# Patient Record
Sex: FEMALE | Race: WHITE | NOT HISPANIC OR LATINO | Employment: FULL TIME | URBAN - METROPOLITAN AREA
[De-identification: names, ages, dates, MRNs, and addresses within clinical notes are randomized per-mention and may not be internally consistent; named-entity substitution may affect disease eponyms.]

---

## 2023-10-17 ENCOUNTER — OFFICE VISIT (OUTPATIENT)
Dept: PRIMARY CARE | Facility: CLINIC | Age: 33
End: 2023-10-17
Payer: COMMERCIAL

## 2023-10-17 VITALS
OXYGEN SATURATION: 97 % | DIASTOLIC BLOOD PRESSURE: 76 MMHG | HEIGHT: 65 IN | WEIGHT: 183 LBS | TEMPERATURE: 98.2 F | SYSTOLIC BLOOD PRESSURE: 108 MMHG | BODY MASS INDEX: 30.49 KG/M2 | HEART RATE: 63 BPM

## 2023-10-17 DIAGNOSIS — E55.9 VITAMIN D DEFICIENCY: ICD-10-CM

## 2023-10-17 DIAGNOSIS — Z76.89 ENCOUNTER TO ESTABLISH CARE: Primary | ICD-10-CM

## 2023-10-17 DIAGNOSIS — Z00.00 HEALTHCARE MAINTENANCE: ICD-10-CM

## 2023-10-17 DIAGNOSIS — F41.1 ANXIETY, GENERALIZED: ICD-10-CM

## 2023-10-17 DIAGNOSIS — R53.82 CHRONIC FATIGUE: ICD-10-CM

## 2023-10-17 DIAGNOSIS — F33.0 MILD EPISODE OF RECURRENT MAJOR DEPRESSIVE DISORDER (CMS-HCC): ICD-10-CM

## 2023-10-17 PROBLEM — F33.9 RECURRENT MAJOR DEPRESSIVE DISORDER (CMS-HCC): Status: ACTIVE | Noted: 2023-10-17

## 2023-10-17 PROCEDURE — 1036F TOBACCO NON-USER: CPT | Performed by: STUDENT IN AN ORGANIZED HEALTH CARE EDUCATION/TRAINING PROGRAM

## 2023-10-17 PROCEDURE — 99214 OFFICE O/P EST MOD 30 MIN: CPT | Performed by: STUDENT IN AN ORGANIZED HEALTH CARE EDUCATION/TRAINING PROGRAM

## 2023-10-17 RX ORDER — HYDROXYZINE HYDROCHLORIDE 25 MG/1
25 TABLET, FILM COATED ORAL EVERY 8 HOURS PRN
Qty: 12 TABLET | Refills: 0 | Status: SHIPPED | OUTPATIENT
Start: 2023-10-17 | End: 2024-03-04 | Stop reason: ALTCHOICE

## 2023-10-17 ASSESSMENT — PATIENT HEALTH QUESTIONNAIRE - PHQ9
10. IF YOU CHECKED OFF ANY PROBLEMS, HOW DIFFICULT HAVE THESE PROBLEMS MADE IT FOR YOU TO DO YOUR WORK, TAKE CARE OF THINGS AT HOME, OR GET ALONG WITH OTHER PEOPLE: SOMEWHAT DIFFICULT
SUM OF ALL RESPONSES TO PHQ9 QUESTIONS 1 AND 2: 2
2. FEELING DOWN, DEPRESSED OR HOPELESS: SEVERAL DAYS
1. LITTLE INTEREST OR PLEASURE IN DOING THINGS: SEVERAL DAYS

## 2023-10-17 ASSESSMENT — ENCOUNTER SYMPTOMS
CHILLS: 0
COUGH: 0
DIARRHEA: 0
CONSTIPATION: 0
SHORTNESS OF BREATH: 0
ABDOMINAL PAIN: 0
FATIGUE: 1
RHINORRHEA: 0
FEVER: 0
VOMITING: 0
NAUSEA: 0
PALPITATIONS: 0

## 2023-10-17 NOTE — PATIENT INSTRUCTIONS
I went ahead and sent in the prescription to your pharmacy that you can take as needed for significant breakthrough anxiety.    Lab work orders are in place.  You can get them done downstairs fasting for about 10 hours beforehand if you can.  We will give you call back with those results.    Referral to behavioral health for counseling.  Please let me know if you do not hear back in the next week if they have not reached out to you to get you scheduled for regular follow-up.  Or if there is more that you need, please let me know.    We can talk more once we get the results back of the lab work, however you would like to set up an appointment just to get something on the books, I can see you back within the next couple of weeks.  You can get set up with this before you leave today if you would like.    Thank you

## 2023-10-17 NOTE — PROGRESS NOTES
"Subjective   Patient ID: Pamela Maya is a 32 y.o. female who presents for Anxiety.    Anxiety  Patient reports no chest pain, nausea, palpitations or shortness of breath.            She has a history of both depression and anxiety.  She has been on medication in the past. She has been on different ones but she always felt like a zombie.  She was diagnosed with depression and anxiety about 7 years ago.  She had even been admitted in the past due to its severity.   She has been in counseling in the past which has really helped.  She states that she would like to go to counseling but needs to make sure that they will give her work to do.    She has been really tired with very little energy.  She has been getting enough sleep at night.      Review of Systems   Constitutional:  Positive for fatigue. Negative for chills and fever.   HENT:  Negative for congestion and rhinorrhea.    Respiratory:  Negative for cough and shortness of breath.    Cardiovascular:  Negative for chest pain and palpitations.   Gastrointestinal:  Negative for abdominal pain, constipation, diarrhea, nausea and vomiting.       Objective   /76   Pulse 63   Temp 36.8 °C (98.2 °F)   Ht 1.638 m (5' 4.5\")   Wt 83 kg (183 lb)   SpO2 97%   BMI 30.93 kg/m²     Physical Exam  Vitals and nursing note reviewed.   Constitutional:       General: She is not in acute distress.     Appearance: Normal appearance. She is normal weight. She is not ill-appearing or toxic-appearing.   HENT:      Head: Normocephalic and atraumatic.   Cardiovascular:      Rate and Rhythm: Normal rate and regular rhythm.      Heart sounds: Normal heart sounds.   Pulmonary:      Effort: Pulmonary effort is normal.      Breath sounds: Normal breath sounds.   Abdominal:      General: Bowel sounds are normal.      Palpations: Abdomen is soft.   Neurological:      Mental Status: She is alert.         Assessment/Plan   Problem List Items Addressed This Visit             ICD-10-CM "    Anxiety, generalized F41.1    Relevant Medications    hydrOXYzine HCL (Atarax) 25 mg tablet    Other Relevant Orders    Vitamin D 25-Hydroxy,Total (for eval of Vitamin D levels)    Thyroid Stimulating Hormone    Thyroxine, Free    Vitamin B12    Follow Up In [facility]    Recurrent major depressive disorder (CMS/HCC) F33.9    Relevant Orders    Vitamin D 25-Hydroxy,Total (for eval of Vitamin D levels)    Thyroid Stimulating Hormone    Thyroxine, Free    Vitamin B12    Follow Up In [facility]    Vitamin D deficiency E55.9    Relevant Orders    Vitamin D 25-Hydroxy,Total (for eval of Vitamin D levels)    Chronic fatigue R53.82    Relevant Orders    CBC and Auto Differential    Comprehensive Metabolic Panel    Lipid Panel    Vitamin D 25-Hydroxy,Total (for eval of Vitamin D levels)    Thyroid Stimulating Hormone    Thyroxine, Free    Vitamin B12    Follow Up In [facility]     Other Visit Diagnoses         Codes    Encounter to establish care    -  Primary Z76.89    Healthcare maintenance     Z00.00    Relevant Orders    CBC and Auto Differential    Comprehensive Metabolic Panel    Lipid Panel    Vitamin D 25-Hydroxy,Total (for eval of Vitamin D levels)    Urinalysis with Reflex Microscopic    Thyroid Stimulating Hormone    Thyroxine, Free    Vitamin B12          History and physical examination as above.  Patient presenting to follow-up for increasing recurrent depression and anxiety.  Patient feels that she only needs medication for breakthrough.  Sent in refill for hydroxyzine.  Patient is agreeable to getting a referral for counseling.  She will call back if she does not hear back from them.  Patient will plan on follow-up in the next couple of weeks for a wellness exam as well as to follow-up on anxiety and depression.  Lab work orders to be done  prior to her next appointment.  Patient will come in sooner for other acute concerns or complaints.

## 2023-10-23 ENCOUNTER — LAB (OUTPATIENT)
Dept: LAB | Facility: LAB | Age: 33
End: 2023-10-23
Payer: COMMERCIAL

## 2023-10-23 DIAGNOSIS — Z00.00 HEALTHCARE MAINTENANCE: ICD-10-CM

## 2023-10-23 DIAGNOSIS — E55.9 VITAMIN D DEFICIENCY: ICD-10-CM

## 2023-10-23 DIAGNOSIS — F41.1 ANXIETY, GENERALIZED: ICD-10-CM

## 2023-10-23 DIAGNOSIS — F33.0 MILD EPISODE OF RECURRENT MAJOR DEPRESSIVE DISORDER (CMS-HCC): ICD-10-CM

## 2023-10-23 DIAGNOSIS — R53.82 CHRONIC FATIGUE: ICD-10-CM

## 2023-10-23 LAB
25(OH)D3 SERPL-MCNC: 33 NG/ML (ref 30–100)
ALBUMIN SERPL BCP-MCNC: 4.4 G/DL (ref 3.4–5)
ALP SERPL-CCNC: 66 U/L (ref 33–110)
ALT SERPL W P-5'-P-CCNC: 55 U/L (ref 7–45)
ANION GAP SERPL CALC-SCNC: 13 MMOL/L (ref 10–20)
APPEARANCE UR: ABNORMAL
AST SERPL W P-5'-P-CCNC: 41 U/L (ref 9–39)
BASOPHILS # BLD AUTO: 0.05 X10*3/UL (ref 0–0.1)
BASOPHILS NFR BLD AUTO: 0.7 %
BILIRUB SERPL-MCNC: 0.2 MG/DL (ref 0–1.2)
BILIRUB UR STRIP.AUTO-MCNC: NEGATIVE MG/DL
BUN SERPL-MCNC: 17 MG/DL (ref 6–23)
CALCIUM SERPL-MCNC: 9.3 MG/DL (ref 8.6–10.6)
CHLORIDE SERPL-SCNC: 106 MMOL/L (ref 98–107)
CHOLEST SERPL-MCNC: 132 MG/DL (ref 0–199)
CHOLESTEROL/HDL RATIO: 2.7
CO2 SERPL-SCNC: 29 MMOL/L (ref 21–32)
COLOR UR: YELLOW
CREAT SERPL-MCNC: 0.87 MG/DL (ref 0.5–1.05)
EOSINOPHIL # BLD AUTO: 0.13 X10*3/UL (ref 0–0.7)
EOSINOPHIL NFR BLD AUTO: 1.9 %
ERYTHROCYTE [DISTWIDTH] IN BLOOD BY AUTOMATED COUNT: 11.9 % (ref 11.5–14.5)
GFR SERPL CREATININE-BSD FRML MDRD: >90 ML/MIN/1.73M*2
GLUCOSE SERPL-MCNC: 91 MG/DL (ref 74–99)
GLUCOSE UR STRIP.AUTO-MCNC: NEGATIVE MG/DL
HCT VFR BLD AUTO: 42.9 % (ref 36–46)
HDLC SERPL-MCNC: 48.9 MG/DL
HGB BLD-MCNC: 13.9 G/DL (ref 12–16)
IMM GRANULOCYTES # BLD AUTO: 0.01 X10*3/UL (ref 0–0.7)
IMM GRANULOCYTES NFR BLD AUTO: 0.1 % (ref 0–0.9)
KETONES UR STRIP.AUTO-MCNC: NEGATIVE MG/DL
LDLC SERPL CALC-MCNC: 65 MG/DL
LEUKOCYTE ESTERASE UR QL STRIP.AUTO: NEGATIVE
LYMPHOCYTES # BLD AUTO: 2.63 X10*3/UL (ref 1.2–4.8)
LYMPHOCYTES NFR BLD AUTO: 37.7 %
MCH RBC QN AUTO: 30.1 PG (ref 26–34)
MCHC RBC AUTO-ENTMCNC: 32.4 G/DL (ref 32–36)
MCV RBC AUTO: 93 FL (ref 80–100)
MONOCYTES # BLD AUTO: 0.67 X10*3/UL (ref 0.1–1)
MONOCYTES NFR BLD AUTO: 9.6 %
NEUTROPHILS # BLD AUTO: 3.49 X10*3/UL (ref 1.2–7.7)
NEUTROPHILS NFR BLD AUTO: 50 %
NITRITE UR QL STRIP.AUTO: NEGATIVE
NON HDL CHOLESTEROL: 83 MG/DL (ref 0–149)
NRBC BLD-RTO: 0 /100 WBCS (ref 0–0)
PH UR STRIP.AUTO: 5 [PH]
PLATELET # BLD AUTO: 342 X10*3/UL (ref 150–450)
PMV BLD AUTO: 9.4 FL (ref 7.5–11.5)
POTASSIUM SERPL-SCNC: 4 MMOL/L (ref 3.5–5.3)
PROT SERPL-MCNC: 6.7 G/DL (ref 6.4–8.2)
PROT UR STRIP.AUTO-MCNC: NEGATIVE MG/DL
RBC # BLD AUTO: 4.62 X10*6/UL (ref 4–5.2)
RBC # UR STRIP.AUTO: NEGATIVE /UL
SODIUM SERPL-SCNC: 144 MMOL/L (ref 136–145)
SP GR UR STRIP.AUTO: 1.02
T4 FREE SERPL-MCNC: 1.1 NG/DL (ref 0.78–1.48)
TRIGL SERPL-MCNC: 91 MG/DL (ref 0–149)
TSH SERPL-ACNC: 1.97 MIU/L (ref 0.44–3.98)
UROBILINOGEN UR STRIP.AUTO-MCNC: <2 MG/DL
VIT B12 SERPL-MCNC: 628 PG/ML (ref 211–911)
VLDL: 18 MG/DL (ref 0–40)
WBC # BLD AUTO: 7 X10*3/UL (ref 4.4–11.3)

## 2023-10-23 PROCEDURE — 80061 LIPID PANEL: CPT

## 2023-10-23 PROCEDURE — 80053 COMPREHEN METABOLIC PANEL: CPT

## 2023-10-23 PROCEDURE — 81003 URINALYSIS AUTO W/O SCOPE: CPT

## 2023-10-23 PROCEDURE — 84443 ASSAY THYROID STIM HORMONE: CPT

## 2023-10-23 PROCEDURE — 82607 VITAMIN B-12: CPT

## 2023-10-23 PROCEDURE — 36415 COLL VENOUS BLD VENIPUNCTURE: CPT

## 2023-10-23 PROCEDURE — 85025 COMPLETE CBC W/AUTO DIFF WBC: CPT

## 2023-10-23 PROCEDURE — 82306 VITAMIN D 25 HYDROXY: CPT

## 2023-10-23 PROCEDURE — 84439 ASSAY OF FREE THYROXINE: CPT

## 2023-10-31 ENCOUNTER — SOCIAL WORK (OUTPATIENT)
Dept: PRIMARY CARE | Facility: CLINIC | Age: 33
End: 2023-10-31
Payer: COMMERCIAL

## 2023-10-31 DIAGNOSIS — R53.82 CHRONIC FATIGUE: ICD-10-CM

## 2023-10-31 DIAGNOSIS — F41.1 ANXIETY, GENERALIZED: ICD-10-CM

## 2023-10-31 DIAGNOSIS — F33.0 MILD EPISODE OF RECURRENT MAJOR DEPRESSIVE DISORDER (CMS-HCC): ICD-10-CM
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
## 2023-11-01 ASSESSMENT — ANXIETY QUESTIONNAIRES
4. TROUBLE RELAXING: NEARLY EVERY DAY
5. BEING SO RESTLESS THAT IT IS HARD TO SIT STILL: MORE THAN HALF THE DAYS
2. NOT BEING ABLE TO STOP OR CONTROL WORRYING: NEARLY EVERY DAY
7. FEELING AFRAID AS IF SOMETHING AWFUL MIGHT HAPPEN: NEARLY EVERY DAY
6. BECOMING EASILY ANNOYED OR IRRITABLE: NEARLY EVERY DAY
IF YOU CHECKED OFF ANY PROBLEMS ON THIS QUESTIONNAIRE, HOW DIFFICULT HAVE THESE PROBLEMS MADE IT FOR YOU TO DO YOUR WORK, TAKE CARE OF THINGS AT HOME, OR GET ALONG WITH OTHER PEOPLE: VERY DIFFICULT
3. WORRYING TOO MUCH ABOUT DIFFERENT THINGS: NEARLY EVERY DAY
1. FEELING NERVOUS, ANXIOUS, OR ON EDGE: NEARLY EVERY DAY
GAD7 TOTAL SCORE: 20

## 2023-11-01 ASSESSMENT — PATIENT HEALTH QUESTIONNAIRE - PHQ9
6. FEELING BAD ABOUT YOURSELF - OR THAT YOU ARE A FAILURE OR HAVE LET YOURSELF OR YOUR FAMILY DOWN: NEARLY EVERY DAY
8. MOVING OR SPEAKING SO SLOWLY THAT OTHER PEOPLE COULD HAVE NOTICED. OR THE OPPOSITE, BEING SO FIGETY OR RESTLESS THAT YOU HAVE BEEN MOVING AROUND A LOT MORE THAN USUAL: MORE THAN HALF THE DAYS
2. FEELING DOWN, DEPRESSED OR HOPELESS: MORE THAN HALF THE DAYS
5. POOR APPETITE OR OVEREATING: NEARLY EVERY DAY
9. THOUGHTS THAT YOU WOULD BE BETTER OFF DEAD, OR OF HURTING YOURSELF: MORE THAN HALF THE DAYS
SUM OF ALL RESPONSES TO PHQ QUESTIONS 1-9: 23
1. LITTLE INTEREST OR PLEASURE IN DOING THINGS: NEARLY EVERY DAY
SUM OF ALL RESPONSES TO PHQ9 QUESTIONS 1 & 2: 5
10. IF YOU CHECKED OFF ANY PROBLEMS, HOW DIFFICULT HAVE THESE PROBLEMS MADE IT FOR YOU TO DO YOUR WORK, TAKE CARE OF THINGS AT HOME, OR GET ALONG WITH OTHER PEOPLE: VERY DIFFICULT
7. TROUBLE CONCENTRATING ON THINGS, SUCH AS READING THE NEWSPAPER OR WATCHING TELEVISION: MORE THAN HALF THE DAYS
4. FEELING TIRED OR HAVING LITTLE ENERGY: NEARLY EVERY DAY
3. TROUBLE FALLING OR STAYING ASLEEP: NEARLY EVERY DAY

## 2023-11-03 ENCOUNTER — DOCUMENTATION (OUTPATIENT)
Dept: BEHAVIORAL HEALTH | Facility: CLINIC | Age: 33
End: 2023-11-03
Payer: COMMERCIAL

## 2023-11-03 ENCOUNTER — DOCUMENTATION (OUTPATIENT)
Dept: PRIMARY CARE | Facility: CLINIC | Age: 33
End: 2023-11-03
Payer: COMMERCIAL

## 2023-11-03 DIAGNOSIS — F41.1 ANXIETY, GENERALIZED: ICD-10-CM

## 2023-11-03 DIAGNOSIS — F33.0 MILD EPISODE OF RECURRENT MAJOR DEPRESSIVE DISORDER (CMS-HCC): Primary | ICD-10-CM

## 2023-11-03 PROCEDURE — 99492 1ST PSYC COLLAB CARE MGMT: CPT | Performed by: STUDENT IN AN ORGANIZED HEALTH CARE EDUCATION/TRAINING PROGRAM
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
## 2023-11-13 ENCOUNTER — OFFICE VISIT (OUTPATIENT)
Dept: PRIMARY CARE | Facility: CLINIC | Age: 33
End: 2023-11-13
Payer: COMMERCIAL

## 2023-11-13 VITALS
HEART RATE: 68 BPM | OXYGEN SATURATION: 95 % | WEIGHT: 180 LBS | TEMPERATURE: 96 F | HEIGHT: 65 IN | BODY MASS INDEX: 29.99 KG/M2 | DIASTOLIC BLOOD PRESSURE: 71 MMHG | SYSTOLIC BLOOD PRESSURE: 104 MMHG

## 2023-11-13 DIAGNOSIS — F33.1 MODERATE EPISODE OF RECURRENT MAJOR DEPRESSIVE DISORDER (MULTI): ICD-10-CM

## 2023-11-13 DIAGNOSIS — F41.1 ANXIETY, GENERALIZED: ICD-10-CM

## 2023-11-13 DIAGNOSIS — R10.13 EPIGASTRIC PAIN: ICD-10-CM

## 2023-11-13 DIAGNOSIS — R10.12 LEFT UPPER QUADRANT PAIN: ICD-10-CM

## 2023-11-13 DIAGNOSIS — E55.9 VITAMIN D INSUFFICIENCY: Primary | ICD-10-CM

## 2023-11-13 DIAGNOSIS — R10.11 RIGHT UPPER QUADRANT PAIN: ICD-10-CM

## 2023-11-13 PROCEDURE — 1036F TOBACCO NON-USER: CPT | Performed by: STUDENT IN AN ORGANIZED HEALTH CARE EDUCATION/TRAINING PROGRAM

## 2023-11-13 PROCEDURE — 99214 OFFICE O/P EST MOD 30 MIN: CPT | Performed by: STUDENT IN AN ORGANIZED HEALTH CARE EDUCATION/TRAINING PROGRAM

## 2023-11-13 RX ORDER — OMEPRAZOLE 40 MG/1
40 CAPSULE, DELAYED RELEASE ORAL
Qty: 30 CAPSULE | Refills: 0 | Status: SHIPPED | OUTPATIENT
Start: 2023-11-13 | End: 2023-12-11 | Stop reason: ALTCHOICE

## 2023-11-13 RX ORDER — VENLAFAXINE HYDROCHLORIDE 75 MG/1
75 CAPSULE, EXTENDED RELEASE ORAL DAILY
Qty: 30 CAPSULE | Refills: 1 | Status: SHIPPED | OUTPATIENT
Start: 2023-11-13 | End: 2023-12-11 | Stop reason: SDUPTHER

## 2023-11-13 RX ORDER — ERGOCALCIFEROL 1.25 MG/1
50000 CAPSULE ORAL
Qty: 8 CAPSULE | Refills: 0 | Status: SHIPPED | OUTPATIENT
Start: 2023-11-13 | End: 2024-01-08

## 2023-11-13 RX ORDER — SUCRALFATE 1 G/1
1 TABLET ORAL
Qty: 32 TABLET | Refills: 0 | Status: SHIPPED | OUTPATIENT
Start: 2023-11-13 | End: 2023-12-11 | Stop reason: ALTCHOICE

## 2023-11-13 ASSESSMENT — PATIENT HEALTH QUESTIONNAIRE - PHQ9
10. IF YOU CHECKED OFF ANY PROBLEMS, HOW DIFFICULT HAVE THESE PROBLEMS MADE IT FOR YOU TO DO YOUR WORK, TAKE CARE OF THINGS AT HOME, OR GET ALONG WITH OTHER PEOPLE: VERY DIFFICULT
2. FEELING DOWN, DEPRESSED OR HOPELESS: MORE THAN HALF THE DAYS
6. FEELING BAD ABOUT YOURSELF - OR THAT YOU ARE A FAILURE OR HAVE LET YOURSELF OR YOUR FAMILY DOWN: MORE THAN HALF THE DAYS
4. FEELING TIRED OR HAVING LITTLE ENERGY: NEARLY EVERY DAY
SUM OF ALL RESPONSES TO PHQ9 QUESTIONS 1 AND 2: 4
5. POOR APPETITE OR OVEREATING: SEVERAL DAYS
3. TROUBLE FALLING OR STAYING ASLEEP OR SLEEPING TOO MUCH: MORE THAN HALF THE DAYS
SUM OF ALL RESPONSES TO PHQ QUESTIONS 1-9: 15
9. THOUGHTS THAT YOU WOULD BE BETTER OFF DEAD, OR OF HURTING YOURSELF: MORE THAN HALF THE DAYS
8. MOVING OR SPEAKING SO SLOWLY THAT OTHER PEOPLE COULD HAVE NOTICED. OR THE OPPOSITE, BEING SO FIGETY OR RESTLESS THAT YOU HAVE BEEN MOVING AROUND A LOT MORE THAN USUAL: SEVERAL DAYS
7. TROUBLE CONCENTRATING ON THINGS, SUCH AS READING THE NEWSPAPER OR WATCHING TELEVISION: NOT AT ALL
1. LITTLE INTEREST OR PLEASURE IN DOING THINGS: MORE THAN HALF THE DAYS

## 2023-11-13 ASSESSMENT — COLUMBIA-SUICIDE SEVERITY RATING SCALE - C-SSRS
1. IN THE PAST MONTH, HAVE YOU WISHED YOU WERE DEAD OR WISHED YOU COULD GO TO SLEEP AND NOT WAKE UP?: YES
6. HAVE YOU EVER DONE ANYTHING, STARTED TO DO ANYTHING, OR PREPARED TO DO ANYTHING TO END YOUR LIFE?: NO
2. HAVE YOU ACTUALLY HAD ANY THOUGHTS OF KILLING YOURSELF?: NO

## 2023-11-13 NOTE — PATIENT INSTRUCTIONS
Thank you for coming in for your appointment today.    I went ahead and started the medication venlafaxine/Effexor for you.  We are starting at 75 mg.  It is a capsule so you will not be able to take a reduced dose or cut the capsule in half.  This is a daily medication.  He can be taken either in the morning or the evening but I do recommend starting off taking first off.  If you do have some minor side effects and they are fairly bearable, most of them will go away within the first 1 to 2 weeks.  If there is anything more serious especially if you are having any thoughts about hurting yourself or anything becoming more serious or changing, you need to stop the medication immediately and at least let the office know or if it is something more concerning you need to go to the emergency department.    We are also going to try to work-up some of this increased abdominal pain.  I put in for an abdominal ultrasound as it does not look like there is ever been 1 done in the past before.  I am also going to start 2 different medications.  One of them is a trial of omeprazole to see if there is a reflux component in relation to your symptoms.  Please start this medication first.  If it does not help, then go ahead and add some of the Carafate to this as well to see if this could be helpful.  It sounds like in the last upper scope that they did they did see some gastritis which would be inflammation of the stomach.  We can see if this is a component of your symptoms especially given the location.    Lets make sure that we follow-up within the next month.  You can always call sooner if anything changes we can try to get you in before that time.    If you are not able to arrive at a good place with either condition, we can always refer either to gastroenterology or to a separate psychiatrist if needed.    Please call with any other questions or concerns.    Thank you

## 2023-11-13 NOTE — PROGRESS NOTES
"Subjective   Patient ID: Pamela Maya is a 32 y.o. female who presents for Follow-up.    HPI     She is coming in for follow up.  Patient reports that she has been having increased symptoms of her combination anxiety and depression.  She has been going to counseling which has been significantly helping however she thinks that she needs additional treatment.  She thinks that she agreeable to starting more of a controller medication.  The medication that she had been on taking it as needed has not been helping.  She denies any thoughts of harming herself or anyone else.  She does have another counseling session coming up at the end of the week.    She is also been having intermittent abdominal pain which she has not really gotten a good answer for what she is experiencing.  She reports pain primarily in the upper quadrants both on the right and left side as well as the gastric region.  She has never been really tried on different treatments before in the past or had any sort of imaging done either.  She states that the symptoms are mostly always there but definitely flare up occasionally at times.      Review of Systems   Constitutional:  Positive for fatigue. Negative for chills and fever.   HENT:  Negative for congestion and rhinorrhea.    Respiratory:  Negative for cough and shortness of breath.    Cardiovascular:  Negative for chest pain and palpitations.   Gastrointestinal:  Positive for abdominal pain. Negative for constipation, diarrhea, nausea and vomiting.   Psychiatric/Behavioral:  Positive for dysphoric mood. The patient is nervous/anxious.        Objective   /71   Pulse 68   Temp 35.6 °C (96 °F)   Ht 1.638 m (5' 4.5\")   Wt 81.6 kg (180 lb)   SpO2 95%   BMI 30.42 kg/m²     Physical Exam  Vitals and nursing note reviewed.   Constitutional:       General: She is not in acute distress.     Appearance: Normal appearance. She is normal weight. She is not ill-appearing or toxic-appearing.   HENT: "      Head: Normocephalic and atraumatic.   Cardiovascular:      Rate and Rhythm: Normal rate and regular rhythm.      Heart sounds: Normal heart sounds.   Pulmonary:      Effort: Pulmonary effort is normal.      Breath sounds: Normal breath sounds.   Abdominal:      General: Bowel sounds are normal.      Palpations: Abdomen is soft.      Tenderness: There is abdominal tenderness (Mild in the epigastric, right upper quadrant and left upper quadrant).   Neurological:      Mental Status: She is alert.         Assessment/Plan   Problem List Items Addressed This Visit             ICD-10-CM    Anxiety, generalized F41.1    Relevant Medications    venlafaxine XR (Effexor-XR) 75 mg 24 hr capsule    Recurrent major depressive disorder (CMS/HCC) F33.9    Relevant Medications    venlafaxine XR (Effexor-XR) 75 mg 24 hr capsule     Other Visit Diagnoses         Codes    Vitamin D insufficiency    -  Primary E55.9    Relevant Medications    ergocalciferol (Vitamin D-2) 1.25 MG (89124 UT) capsule    Other Relevant Orders    Vitamin D 25-Hydroxy,Total (for eval of Vitamin D levels)    Epigastric pain     R10.13    Relevant Medications    omeprazole (PriLOSEC) 40 mg DR capsule    sucralfate (Carafate) 1 gram tablet    Other Relevant Orders    US abdomen    Right upper quadrant pain     R10.11    Relevant Orders    US abdomen    Left upper quadrant pain     R10.12    Relevant Medications    omeprazole (PriLOSEC) 40 mg DR capsule    sucralfate (Carafate) 1 gram tablet    Other Relevant Orders    US abdomen          History and physical examination as above.  We will plan on starting on medication with venlafaxine.  This medication looks like it was recommended by psychiatric consult with her collaborative behavioral health as well.  Discussed what to expect with the patient and instructed to call immediately if she starts having any other side effects or symptoms.  She will continue with regular counseling sessions with her next one  scheduled at the end of the week.  Will start workup for some of the abdominal pain with an abdominal ultrasound since she has never had any imaging in the past before.  We will plan on starting on omeprazole to see if there is a reflux component to her symptoms as well as sucralfate to see if this can also calm down symptoms in case this is a possible ulcer.  If this does take care of her symptoms, depending on longevity, may need additional follow-up with GI.  Plan on following up in 1 month.  Discussed signs and symptoms that would require reevaluation in the office versus immediate treatment in the emergency department at a sooner time.  She is understanding and in agreement with this plan.      Go for blood tests as directed. Your doctor will do lab tests at regular visits to monitor the effects of this medicine. Please follow up with your doctor and keep your health care provider appointments.

## 2023-11-15 ENCOUNTER — TELEMEDICINE (OUTPATIENT)
Dept: PRIMARY CARE | Facility: CLINIC | Age: 33
End: 2023-11-15
Payer: COMMERCIAL

## 2023-11-15 DIAGNOSIS — F41.1 ANXIETY, GENERALIZED: Primary | ICD-10-CM

## 2023-11-15 NOTE — PROGRESS NOTES
[facility] ([facility])  Progress Note    Type of Interaction: Virtual    Start Time: 3:30 pm    End Time: 4:28 pm        Appointment: Scheduled    Reason for Visit:   Chief Complaint   Patient presents with    Anxiety    Depression    Met with patient for a follow up virtual appointment. Patient was put on effexor by Dr. Castro and just started yesterday. Patient has been feeling pretty exhausted, overall mood is still low. Patient is going to be using opposite action and going out with coworkers, her  encouraged her to stick to her commitment.  Patient wants to work on how to get out of downward spiral thinking- beat self up for feeling how she is feeling and feels like she should be feeling better.    Patient puts on a front with her mood and then is exhausted after the fact  Feels like it goes from one extreme to another with her emotions; wants to find a middle ground. Patient would like to be able to be her authentic self and understand what that means and who she is and no letting her mental health label/define her.   Will discuss healthy ways to explore self as an adult and encouraged patient to speak with  about her having down time to have self care bc it is very limited or really non existent as of currently.           Interval History / Patient Symptoms:     Patient Health Questionnaire-9 Score: 15 (11/13/2023  4:15 PM)        Interventions Provided: Psychoeducation and Treatment Planning      Progress Made: Mixed    Response to Intervention: patient open and engaging and able to identify what she struggles with and what she would like to work on moving forward.         Plan:   Follow up in 2 weeks  Will refer to CBT trauma therapist.        Follow Up / Next Appointment: Next appointment: 11/29/23

## 2023-11-20 ENCOUNTER — ANCILLARY PROCEDURE (OUTPATIENT)
Dept: RADIOLOGY | Facility: CLINIC | Age: 33
End: 2023-11-20
Payer: COMMERCIAL

## 2023-11-20 DIAGNOSIS — R10.13 EPIGASTRIC PAIN: ICD-10-CM

## 2023-11-20 DIAGNOSIS — R10.11 RIGHT UPPER QUADRANT PAIN: ICD-10-CM

## 2023-11-20 DIAGNOSIS — R10.12 LEFT UPPER QUADRANT PAIN: ICD-10-CM

## 2023-11-20 PROCEDURE — 76700 US EXAM ABDOM COMPLETE: CPT

## 2023-11-20 PROCEDURE — 76700 US EXAM ABDOM COMPLETE: CPT | Performed by: RADIOLOGY

## 2023-11-26 ASSESSMENT — ENCOUNTER SYMPTOMS
RHINORRHEA: 0
DYSPHORIC MOOD: 1
DIARRHEA: 0
NAUSEA: 0
FATIGUE: 1
PALPITATIONS: 0
CONSTIPATION: 0
NERVOUS/ANXIOUS: 1
SHORTNESS OF BREATH: 0
ABDOMINAL PAIN: 1
FEVER: 0
COUGH: 0
VOMITING: 0
CHILLS: 0

## 2023-11-28 ENCOUNTER — APPOINTMENT (OUTPATIENT)
Dept: PRIMARY CARE | Facility: CLINIC | Age: 33
End: 2023-11-28
Payer: COMMERCIAL

## 2023-11-29 ENCOUNTER — TELEMEDICINE (OUTPATIENT)
Dept: PRIMARY CARE | Facility: CLINIC | Age: 33
End: 2023-11-29
Payer: COMMERCIAL

## 2023-11-29 DIAGNOSIS — F33.0 MILD EPISODE OF RECURRENT MAJOR DEPRESSIVE DISORDER (CMS-HCC): Primary | ICD-10-CM

## 2023-11-29 NOTE — PROGRESS NOTES
[facility] ([facility])  Progress Note    Type of Interaction: [employer]    Start Time: 3:30 pm    End Time: 4:30 pm        Appointment: Scheduled    Reason for Visit:   Chief Complaint   Patient presents with    Depression    Met with patient for a follow up appointment. Patient feels like medications are kicking in and she feels her depression and anxiety are better. Still lacking some motivation though. Discussed the co worker get together and how it was not her cup of tea, does not feel like she would do it again but at least used opposite action and attended.  Found 3 women at work, who are supportive of one another. Patient has never had friendships where she was the one who is being given help and support or small tokens of appreciation. Typically patient is the one bending over backwards and this is a new feeling for her, but she feels appreciated and cared about and it is no pressure to hang out outside of work due to schedules.   Working with boundaries with family during the holidays. Patient's  is working over time and patient cannot always accommodate when others in her family want to get together and is assertive and has told these individuals her reasoning. She is at the point where she has explained and that it is, if they do not like it, it is not her problem. Patient also processed some of her relationship with her mom and how it is complicated and patient just wants love and acceptance from her mom and she has not had that in many years.   Patient also wants to gain more coping skills to regulate her emotions and help improve her motivation, so down the road patient is not always relying on medication for help.       Interval History / Patient Symptoms:     No data recorded      Interventions Provided: [place] Setting, Communication Training, and support      Progress Made: Moderate    Response to Intervention: Patient very open and engaging.        Plan: follow up in 2 weeks    Follow Up  / Next Appointment: Next appointment: 12/13/23

## 2023-12-04 ENCOUNTER — DOCUMENTATION (OUTPATIENT)
Dept: PRIMARY CARE | Facility: CLINIC | Age: 33
End: 2023-12-04
Payer: COMMERCIAL

## 2023-12-04 DIAGNOSIS — F41.1 ANXIETY, GENERALIZED: Primary | ICD-10-CM

## 2023-12-04 DIAGNOSIS — F33.9 EPISODE OF RECURRENT MAJOR DEPRESSIVE DISORDER, UNSPECIFIED DEPRESSION EPISODE SEVERITY (CMS-HCC): ICD-10-CM

## 2023-12-04 PROCEDURE — 99494 1ST/SBSQ PSYC COLLAB CARE: CPT | Performed by: STUDENT IN AN ORGANIZED HEALTH CARE EDUCATION/TRAINING PROGRAM

## 2023-12-04 PROCEDURE — 99493 SBSQ PSYC COLLAB CARE MGMT: CPT | Performed by: STUDENT IN AN ORGANIZED HEALTH CARE EDUCATION/TRAINING PROGRAM

## 2023-12-11 ENCOUNTER — OFFICE VISIT (OUTPATIENT)
Dept: PRIMARY CARE | Facility: CLINIC | Age: 33
End: 2023-12-11
Payer: COMMERCIAL

## 2023-12-11 VITALS
DIASTOLIC BLOOD PRESSURE: 70 MMHG | WEIGHT: 180 LBS | BODY MASS INDEX: 29.99 KG/M2 | TEMPERATURE: 96.3 F | HEART RATE: 62 BPM | HEIGHT: 65 IN | OXYGEN SATURATION: 97 % | SYSTOLIC BLOOD PRESSURE: 113 MMHG

## 2023-12-11 DIAGNOSIS — R10.12 LEFT UPPER QUADRANT PAIN: ICD-10-CM

## 2023-12-11 DIAGNOSIS — K82.4 GALLBLADDER POLYP: ICD-10-CM

## 2023-12-11 DIAGNOSIS — F33.1 MODERATE EPISODE OF RECURRENT MAJOR DEPRESSIVE DISORDER (MULTI): Primary | ICD-10-CM

## 2023-12-11 DIAGNOSIS — F41.1 ANXIETY, GENERALIZED: ICD-10-CM

## 2023-12-11 PROCEDURE — 1036F TOBACCO NON-USER: CPT | Performed by: STUDENT IN AN ORGANIZED HEALTH CARE EDUCATION/TRAINING PROGRAM

## 2023-12-11 PROCEDURE — 99214 OFFICE O/P EST MOD 30 MIN: CPT | Performed by: STUDENT IN AN ORGANIZED HEALTH CARE EDUCATION/TRAINING PROGRAM

## 2023-12-11 RX ORDER — VENLAFAXINE HYDROCHLORIDE 75 MG/1
75 CAPSULE, EXTENDED RELEASE ORAL DAILY
Qty: 90 CAPSULE | Refills: 0 | Status: SHIPPED | OUTPATIENT
Start: 2023-12-11 | End: 2024-03-04 | Stop reason: SDUPTHER

## 2023-12-11 ASSESSMENT — PATIENT HEALTH QUESTIONNAIRE - PHQ9
SUM OF ALL RESPONSES TO PHQ9 QUESTIONS 1 AND 2: 2
1. LITTLE INTEREST OR PLEASURE IN DOING THINGS: SEVERAL DAYS
10. IF YOU CHECKED OFF ANY PROBLEMS, HOW DIFFICULT HAVE THESE PROBLEMS MADE IT FOR YOU TO DO YOUR WORK, TAKE CARE OF THINGS AT HOME, OR GET ALONG WITH OTHER PEOPLE: NOT DIFFICULT AT ALL
2. FEELING DOWN, DEPRESSED OR HOPELESS: SEVERAL DAYS

## 2023-12-11 ASSESSMENT — ENCOUNTER SYMPTOMS
RHINORRHEA: 0
NERVOUS/ANXIOUS: 1
VOMITING: 0
CONSTIPATION: 0
PALPITATIONS: 0
ABDOMINAL PAIN: 1
FEVER: 0
SHORTNESS OF BREATH: 0
DYSPHORIC MOOD: 1
NAUSEA: 0
DIARRHEA: 0
COUGH: 0
FATIGUE: 0
CHILLS: 0

## 2023-12-11 NOTE — PROGRESS NOTES
"Subjective   Patient ID: Pamela Maya is a 32 y.o. female who presents for Follow-up.    HPI     She has been doing well with the venlafaxine.  She has been taking it at night. She had started getting a headache with the medication a few hours after. These have overall improved and she has not noticed headaches even at night.  She noticed that even when she first started taking it she felt more relaxed.  She has been doing more of the things that she normally enjoys.  She definitely feels that this has been helping both her depression and her increased anxiety.  She has noticed a significant difference especially coming up on the holidays.  She would like to continue the medication at the same dose for now.   She is continuing to follow with counseling and definitely feels like this is also helping.    Patient is still having abdominal pain primarily in the left upper quadrant.  She admits to an occasional right upper quadrant pain but is primarily in the left.  This has been largely unchanged despite her previous treatment with omeprazole and the Carafate.    Review of Systems   Constitutional:  Negative for chills, fatigue and fever.   HENT:  Negative for congestion and rhinorrhea.    Respiratory:  Negative for cough and shortness of breath.    Cardiovascular:  Negative for chest pain and palpitations.   Gastrointestinal:  Positive for abdominal pain. Negative for constipation, diarrhea, nausea and vomiting.   Psychiatric/Behavioral:  Positive for dysphoric mood. The patient is nervous/anxious.        Objective   /70   Pulse 62   Temp 35.7 °C (96.3 °F)   Ht 1.638 m (5' 4.5\")   Wt 81.6 kg (180 lb)   SpO2 97%   BMI 30.42 kg/m²     Physical Exam  Vitals and nursing note reviewed.   Constitutional:       General: She is not in acute distress.     Appearance: Normal appearance. She is normal weight. She is not ill-appearing or toxic-appearing.   HENT:      Head: Normocephalic and atraumatic. "   Cardiovascular:      Rate and Rhythm: Normal rate and regular rhythm.      Heart sounds: Normal heart sounds.   Pulmonary:      Effort: Pulmonary effort is normal.      Breath sounds: Normal breath sounds.   Abdominal:      General: Bowel sounds are normal.      Palpations: Abdomen is soft.      Tenderness: There is abdominal tenderness (Mild in the epigastric, right upper quadrant and left upper quadrant).   Neurological:      Mental Status: She is alert.         Assessment/Plan   Problem List Items Addressed This Visit             ICD-10-CM    Anxiety, generalized F41.1    Relevant Medications    venlafaxine XR (Effexor-XR) 75 mg 24 hr capsule    Recurrent major depressive disorder (CMS/HCC) - Primary F33.9    Relevant Medications    venlafaxine XR (Effexor-XR) 75 mg 24 hr capsule     Other Visit Diagnoses         Codes    Gallbladder polyp     K82.4    Relevant Orders    Referral to General Surgery    Left upper quadrant pain     R10.12    Relevant Orders    Referral to General Surgery          History and physical examination as above.  Patient presenting for follow-up.  Patient overall doing better on the venlafaxine medication for depression anxiety.  Will continue at current dosing for now.  Patient with continued abdominal pain primarily now in the right upper quadrant.  It has been in the epigastric area and in the right upper quadrant in the past.  No change with the omeprazole or the Carafate.  Ultrasound not showing any abnormal findings except for gallbladder polyp.  Still uncertain etiology of patient's symptoms as she states this has been going on for some time.  We will try referring to general surgery to see if there is any additional testing they could do to help to isolate the area.  May also try gastroenterology as well.  Plan for follow-up in the next 3 months.  Patient will come in sooner for any other acute concerns or complaints.

## 2023-12-13 ENCOUNTER — TELEMEDICINE (OUTPATIENT)
Dept: PRIMARY CARE | Facility: CLINIC | Age: 33
End: 2023-12-13
Payer: COMMERCIAL

## 2023-12-13 DIAGNOSIS — F33.9 EPISODE OF RECURRENT MAJOR DEPRESSIVE DISORDER, UNSPECIFIED DEPRESSION EPISODE SEVERITY (CMS-HCC): Primary | ICD-10-CM

## 2023-12-13 NOTE — PROGRESS NOTES
[facility] ([facility])  Progress Note    Type of Interaction: Virtual    Start Time: 3:30 pm    End Time: 4:30 pm        Appointment: Scheduled    Reason for Visit:   Chief Complaint   Patient presents with    Depression    Anxiety    Meeting with patient virtually for a follow up visit.   Patient has gotten back into working out and reading and routines, feeling good about this and motivation has increased. Patient is feeling better but still not really feel con yet, feels more mellowed out. Discussed that some things going on at home and how much her  has been working may be part of why patient is not feeling more of her positive emotions due to stress.   Patient's  is leaving for 3 weeks for work at the end of January. Patient is feeling a little detached from her  just because of his work schedule, and feels like she is a single parent. She is trying to be supportive of his work and his job though. Patient does a good job in communication what she needs and is looking for when it comes to her  but wants to talk with him after he returns from the work trip about his schedule and some changes that can be made. He is up for a promotion in February and hopefully will be able to get off the shift he is currently on, which they were supposed to re evaluate 6 months into it and now it has been 2 years. Processed more of the relationship with her  and what patient feels is lacking and what she is looking for or feels is missing.   .  Interval History / Patient Symptoms:     No data recorded      Interventions Provided:  review progress, support, communication.      Progress Made: Moderate    Response to Intervention: Patient open and engaging.        Plan:   Follow up in 3 weeks    Follow Up / Next Appointment: Next appointment: 01/02/24

## 2023-12-27 ENCOUNTER — APPOINTMENT (OUTPATIENT)
Dept: PRIMARY CARE | Facility: CLINIC | Age: 33
End: 2023-12-27
Payer: COMMERCIAL

## 2023-12-27 ENCOUNTER — OFFICE VISIT (OUTPATIENT)
Dept: PRIMARY CARE | Facility: CLINIC | Age: 33
End: 2023-12-27
Payer: COMMERCIAL

## 2023-12-27 VITALS
TEMPERATURE: 97.8 F | HEART RATE: 85 BPM | BODY MASS INDEX: 29.32 KG/M2 | SYSTOLIC BLOOD PRESSURE: 97 MMHG | WEIGHT: 176 LBS | HEIGHT: 65 IN | DIASTOLIC BLOOD PRESSURE: 75 MMHG | OXYGEN SATURATION: 96 %

## 2023-12-27 DIAGNOSIS — R09.81 NASAL CONGESTION: ICD-10-CM

## 2023-12-27 DIAGNOSIS — J06.9 VIRAL URI WITH COUGH: Primary | ICD-10-CM

## 2023-12-27 PROCEDURE — 1036F TOBACCO NON-USER: CPT | Performed by: STUDENT IN AN ORGANIZED HEALTH CARE EDUCATION/TRAINING PROGRAM

## 2023-12-27 PROCEDURE — 99213 OFFICE O/P EST LOW 20 MIN: CPT | Performed by: STUDENT IN AN ORGANIZED HEALTH CARE EDUCATION/TRAINING PROGRAM

## 2023-12-27 PROCEDURE — 87637 SARSCOV2&INF A&B&RSV AMP PRB: CPT

## 2023-12-27 ASSESSMENT — ENCOUNTER SYMPTOMS
RHINORRHEA: 1
CONSTIPATION: 0
VOMITING: 0
ABDOMINAL PAIN: 0
SINUS PAIN: 0
DIARRHEA: 0
COUGH: 1
DIZZINESS: 0
FATIGUE: 1
CHILLS: 1
SHORTNESS OF BREATH: 1
SORE THROAT: 0
HEADACHES: 0
ARTHRALGIAS: 0
LIGHT-HEADEDNESS: 0
SINUS PRESSURE: 0
FEVER: 1
NAUSEA: 0

## 2023-12-27 ASSESSMENT — PATIENT HEALTH QUESTIONNAIRE - PHQ9
1. LITTLE INTEREST OR PLEASURE IN DOING THINGS: NOT AT ALL
2. FEELING DOWN, DEPRESSED OR HOPELESS: NOT AT ALL
SUM OF ALL RESPONSES TO PHQ9 QUESTIONS 1 AND 2: 0

## 2023-12-27 NOTE — PROGRESS NOTES
"Subjective   Patient ID: Pamela Maya is a 33 y.o. female who presents for Fever (103, wheezing).    Fever   Associated symptoms include congestion and coughing. Pertinent negatives include no abdominal pain, chest pain, diarrhea, headaches, nausea, sore throat or vomiting.        Symptoms for the past 6 days ago.  She started with body aches and fever. She had some pain in the right back also in general.  Fever was also as high as 103. This was staying level with Tylneol and Motrin  About 2-3 days ago she started having a lot of sneezing. Now she is having more of a chest cough as well.  She lost her sense of taste that started a few days ago as well.  She has not had any of her COVID immunizations.    Review of Systems   Constitutional:  Positive for chills, fatigue and fever.   HENT:  Positive for congestion, postnasal drip, rhinorrhea and sneezing. Negative for sinus pressure, sinus pain and sore throat.    Respiratory:  Positive for cough and shortness of breath.    Cardiovascular:  Negative for chest pain.   Gastrointestinal:  Negative for abdominal pain, constipation, diarrhea, nausea and vomiting.   Musculoskeletal:  Negative for arthralgias.   Neurological:  Negative for dizziness, light-headedness and headaches.       Objective   BP 97/75   Pulse 85   Temp 36.6 °C (97.8 °F)   Ht 1.638 m (5' 4.5\")   Wt 79.8 kg (176 lb)   SpO2 96%   BMI 29.74 kg/m²     Physical Exam  Vitals and nursing note reviewed.   Constitutional:       General: She is not in acute distress.     Appearance: Normal appearance. She is normal weight. She is ill-appearing. She is not toxic-appearing.   HENT:      Head: Normocephalic and atraumatic.   Cardiovascular:      Rate and Rhythm: Normal rate and regular rhythm.      Heart sounds: Normal heart sounds.   Pulmonary:      Effort: Pulmonary effort is normal.      Breath sounds: Normal breath sounds.   Neurological:      Mental Status: She is alert.         Assessment/Plan "   Problem List Items Addressed This Visit    None  Visit Diagnoses         Codes    Viral URI with cough    -  Primary J06.9    Relevant Orders    Sars-CoV-2 PCR, Symptomatic (Completed)    RSV PCR (Completed)    Influenza A, and B PCR (Completed)    Nasal congestion     R09.81    Relevant Orders    Sars-CoV-2 PCR, Symptomatic (Completed)    RSV PCR (Completed)    Influenza A, and B PCR (Completed)          History and physical examination as above.  Patient with upper respiratory viral illness with current symptoms.  Patient did have known COVID exposure and other sick contacts.  Testing for COVID, RSV and flu.  Discussed over-the-counter medications that the patient can use to help treat her symptoms.  Patient is past the point of being able to get Paxlovid if her COVID testing is positive, or Tamiflu if her flu testing is positive.  Patient thinks that she is starting to improve a little bit with her symptoms.  She has not been immunized against COVID.  We will contact patient with results of testing.  Discussed that if symptoms worsen or change, we would need to see her back in the office, or if they become increasingly concerning, she would need to go to the emergency department for further evaluation.  We can evaluate her with a chest x-ray if symptoms continue.  She is understanding and in agreement with this plan.

## 2023-12-28 LAB
FLUAV RNA RESP QL NAA+PROBE: NOT DETECTED
FLUBV RNA RESP QL NAA+PROBE: DETECTED
RSV RNA RESP QL NAA+PROBE: NOT DETECTED
SARS-COV-2 RNA RESP QL NAA+PROBE: NOT DETECTED

## 2024-01-02 ENCOUNTER — SOCIAL WORK (OUTPATIENT)
Dept: PRIMARY CARE | Facility: CLINIC | Age: 34
End: 2024-01-02
Payer: COMMERCIAL

## 2024-01-02 NOTE — PROGRESS NOTES
[facility] ([facility])  Progress Note    Type of Interaction: In Office    Start Time: 9:30 am    End Time: 10:30        Appointment: Scheduled    Reason for Visit:   Chief Complaint   Patient presents with    Depression    Met with patient in office, patient was sick starting around dena, feels better now. Patient celebrated birthday recently, and for dena germania saw step sisters and then went to husbands family for dena, and  saw mom for dinner one day as well. Mom's dynamic has changed in a positive way and patient feels like she has more awareness. Processed more of relationship with mom and also how she has laid down boundaries with her mom. Patient is able to talk with her children about respecting others but then not tolerating certain behavior and putting in boundaries so they have more of an understanding when certain things happen.   Had nice long talk with ,  about how she has been feeling as of late and what she is needing from him.  has been promoted and now does not have to travel for work and there is potential for his job to be during the day time instead of third shift. Patient believes that this was meant to happen and that a higher power aided in giving them what they needed as a family. Patient is hopeful moving forward for the future.   Next visit will patient resources for trauma therapists/EMDR as she wants to start working on some unresolved issues.       Interval History / Patient Symptoms:     Patient Health Questionnaire-9 Score: 6 (1/4/2024  1:34 PM)  CONY-7 Total Score: 4 (1/4/2024  1:35 PM)        Interventions Provided:  support and reviewing progress      Progress Made: Moderate    Response to Intervention: patient feeling better and more motivated and has a decrease in her depression and anxiety scales.         Plan:   Follow up in 2 weeks.    Follow Up / Next Appointment: Next appointment: 01/17/24

## 2024-01-03 ENCOUNTER — DOCUMENTATION (OUTPATIENT)
Dept: BEHAVIORAL HEALTH | Facility: CLINIC | Age: 34
End: 2024-01-03
Payer: COMMERCIAL

## 2024-01-04 ENCOUNTER — DOCUMENTATION (OUTPATIENT)
Dept: PRIMARY CARE | Facility: CLINIC | Age: 34
End: 2024-01-04
Payer: COMMERCIAL

## 2024-01-04 DIAGNOSIS — F33.9 EPISODE OF RECURRENT MAJOR DEPRESSIVE DISORDER, UNSPECIFIED DEPRESSION EPISODE SEVERITY (CMS-HCC): ICD-10-CM

## 2024-01-04 DIAGNOSIS — F41.1 ANXIETY, GENERALIZED: Primary | ICD-10-CM

## 2024-01-04 PROCEDURE — 99493 SBSQ PSYC COLLAB CARE MGMT: CPT | Performed by: STUDENT IN AN ORGANIZED HEALTH CARE EDUCATION/TRAINING PROGRAM

## 2024-01-04 ASSESSMENT — ANXIETY QUESTIONNAIRES
7. FEELING AFRAID AS IF SOMETHING AWFUL MIGHT HAPPEN: NOT AT ALL
2. NOT BEING ABLE TO STOP OR CONTROL WORRYING: SEVERAL DAYS
6. BECOMING EASILY ANNOYED OR IRRITABLE: NOT AT ALL
1. FEELING NERVOUS, ANXIOUS, OR ON EDGE: SEVERAL DAYS
4. TROUBLE RELAXING: NOT AT ALL
3. WORRYING TOO MUCH ABOUT DIFFERENT THINGS: SEVERAL DAYS
5. BEING SO RESTLESS THAT IT IS HARD TO SIT STILL: SEVERAL DAYS
GAD7 TOTAL SCORE: 4
IF YOU CHECKED OFF ANY PROBLEMS ON THIS QUESTIONNAIRE, HOW DIFFICULT HAVE THESE PROBLEMS MADE IT FOR YOU TO DO YOUR WORK, TAKE CARE OF THINGS AT HOME, OR GET ALONG WITH OTHER PEOPLE: SOMEWHAT DIFFICULT

## 2024-01-04 ASSESSMENT — PATIENT HEALTH QUESTIONNAIRE - PHQ9
5. POOR APPETITE OR OVEREATING: MORE THAN HALF THE DAYS
2. FEELING DOWN, DEPRESSED OR HOPELESS: NOT AT ALL
7. TROUBLE CONCENTRATING ON THINGS, SUCH AS READING THE NEWSPAPER OR WATCHING TELEVISION: NOT AT ALL
SUM OF ALL RESPONSES TO PHQ QUESTIONS 1-9: 6
3. TROUBLE FALLING OR STAYING ASLEEP: SEVERAL DAYS
10. IF YOU CHECKED OFF ANY PROBLEMS, HOW DIFFICULT HAVE THESE PROBLEMS MADE IT FOR YOU TO DO YOUR WORK, TAKE CARE OF THINGS AT HOME, OR GET ALONG WITH OTHER PEOPLE: SOMEWHAT DIFFICULT
6. FEELING BAD ABOUT YOURSELF - OR THAT YOU ARE A FAILURE OR HAVE LET YOURSELF OR YOUR FAMILY DOWN: SEVERAL DAYS
1. LITTLE INTEREST OR PLEASURE IN DOING THINGS: NOT AT ALL
9. THOUGHTS THAT YOU WOULD BE BETTER OFF DEAD, OR OF HURTING YOURSELF: NOT AT ALL
8. MOVING OR SPEAKING SO SLOWLY THAT OTHER PEOPLE COULD HAVE NOTICED. OR THE OPPOSITE, BEING SO FIGETY OR RESTLESS THAT YOU HAVE BEEN MOVING AROUND A LOT MORE THAN USUAL: SEVERAL DAYS
4. FEELING TIRED OR HAVING LITTLE ENERGY: SEVERAL DAYS
SUM OF ALL RESPONSES TO PHQ9 QUESTIONS 1 & 2: 0

## 2024-01-16 ENCOUNTER — APPOINTMENT (OUTPATIENT)
Dept: SURGERY | Facility: CLINIC | Age: 34
End: 2024-01-16
Payer: COMMERCIAL

## 2024-01-17 ENCOUNTER — APPOINTMENT (OUTPATIENT)
Dept: PRIMARY CARE | Facility: CLINIC | Age: 34
End: 2024-01-17
Payer: COMMERCIAL

## 2024-02-02 ENCOUNTER — DOCUMENTATION (OUTPATIENT)
Dept: PRIMARY CARE | Facility: CLINIC | Age: 34
End: 2024-02-02
Payer: COMMERCIAL

## 2024-02-02 DIAGNOSIS — F33.9 EPISODE OF RECURRENT MAJOR DEPRESSIVE DISORDER, UNSPECIFIED DEPRESSION EPISODE SEVERITY (CMS-HCC): Primary | ICD-10-CM

## 2024-02-02 PROCEDURE — 99493 SBSQ PSYC COLLAB CARE MGMT: CPT | Performed by: STUDENT IN AN ORGANIZED HEALTH CARE EDUCATION/TRAINING PROGRAM

## 2024-02-06 ENCOUNTER — TELEPHONE (OUTPATIENT)
Dept: PRIMARY CARE | Facility: CLINIC | Age: 34
End: 2024-02-06
Payer: COMMERCIAL

## 2024-02-06 NOTE — PROGRESS NOTES
Called and left another follow up message about having patient schedule an appointment to check in now that this provider is now back in the office.

## 2024-02-21 ENCOUNTER — TELEPHONE (OUTPATIENT)
Dept: PRIMARY CARE | Facility: CLINIC | Age: 34
End: 2024-02-21
Payer: COMMERCIAL

## 2024-02-21 NOTE — PROGRESS NOTES
Called and left message for patient to check in and schedule appointment. This provider has previously left a few messages reaching out to patient and has not heard back thus far.

## 2024-02-27 ENCOUNTER — APPOINTMENT (OUTPATIENT)
Dept: SURGERY | Facility: CLINIC | Age: 34
End: 2024-02-27
Payer: COMMERCIAL

## 2024-03-04 ENCOUNTER — OFFICE VISIT (OUTPATIENT)
Dept: PRIMARY CARE | Facility: CLINIC | Age: 34
End: 2024-03-04
Payer: COMMERCIAL

## 2024-03-04 VITALS
OXYGEN SATURATION: 98 % | DIASTOLIC BLOOD PRESSURE: 78 MMHG | HEIGHT: 65 IN | HEART RATE: 61 BPM | BODY MASS INDEX: 28.49 KG/M2 | WEIGHT: 171 LBS | SYSTOLIC BLOOD PRESSURE: 109 MMHG | TEMPERATURE: 97.8 F

## 2024-03-04 DIAGNOSIS — R21 BUTTERFLY RASH: ICD-10-CM

## 2024-03-04 DIAGNOSIS — F33.1 MODERATE EPISODE OF RECURRENT MAJOR DEPRESSIVE DISORDER (MULTI): ICD-10-CM

## 2024-03-04 DIAGNOSIS — R10.13 EPIGASTRIC PAIN: ICD-10-CM

## 2024-03-04 DIAGNOSIS — R10.11 RIGHT UPPER QUADRANT PAIN: ICD-10-CM

## 2024-03-04 DIAGNOSIS — F41.1 ANXIETY, GENERALIZED: ICD-10-CM

## 2024-03-04 DIAGNOSIS — R76.8 POSITIVE ANA (ANTINUCLEAR ANTIBODY): Primary | ICD-10-CM

## 2024-03-04 DIAGNOSIS — R53.82 CHRONIC FATIGUE: ICD-10-CM

## 2024-03-04 DIAGNOSIS — K90.49 FOOD INTOLERANCE: ICD-10-CM

## 2024-03-04 PROBLEM — R19.7 DIARRHEA: Status: ACTIVE | Noted: 2024-03-04

## 2024-03-04 PROCEDURE — 1036F TOBACCO NON-USER: CPT | Performed by: STUDENT IN AN ORGANIZED HEALTH CARE EDUCATION/TRAINING PROGRAM

## 2024-03-04 PROCEDURE — 99214 OFFICE O/P EST MOD 30 MIN: CPT | Performed by: STUDENT IN AN ORGANIZED HEALTH CARE EDUCATION/TRAINING PROGRAM

## 2024-03-04 RX ORDER — VENLAFAXINE HYDROCHLORIDE 75 MG/1
75 CAPSULE, EXTENDED RELEASE ORAL DAILY
Qty: 90 CAPSULE | Refills: 1 | Status: SHIPPED | OUTPATIENT
Start: 2024-03-04 | End: 2024-08-31

## 2024-03-04 ASSESSMENT — PATIENT HEALTH QUESTIONNAIRE - PHQ9
1. LITTLE INTEREST OR PLEASURE IN DOING THINGS: NOT AT ALL
SUM OF ALL RESPONSES TO PHQ9 QUESTIONS 1 AND 2: 0
2. FEELING DOWN, DEPRESSED OR HOPELESS: NOT AT ALL

## 2024-03-04 ASSESSMENT — ENCOUNTER SYMPTOMS
CHILLS: 0
ARTHRALGIAS: 0
DIZZINESS: 0
VOMITING: 0
FATIGUE: 0
FEVER: 0
NAUSEA: 0
DIARRHEA: 0
LIGHT-HEADEDNESS: 0
MYALGIAS: 0
CONSTIPATION: 0
ABDOMINAL DISTENTION: 0

## 2024-03-04 NOTE — PATIENT INSTRUCTIONS
I went ahead and ordered some lab work for you to be done.  I did include some previous testing you had performed for repeat as well as inflammatory markers.  I am also including some testing just a basic liver function testing, pancreas as well as a food allergy profile for further workup.  Not sure if this is ever been done before but I would like to workup the various food intolerances as well as some of the symptoms suggestive of a more underlying autoimmune disease.  Labs can be done tomorrow morning right away either fasted or on fasting.    I did go ahead and place an order for a stat abdominal ultrasound primarily focusing on the right upper quadrant especially because you are having significant pain at least right now.  Previously there was a gallbladder polyp, however I am concerned that there could be a progression of this as well at this point.  We will work to try to get you scheduled and this following day.    I also went ahead and refilled the venlafaxine to your pharmacy.

## 2024-03-04 NOTE — PROGRESS NOTES
"Subjective   Patient ID: Pamela Maya is a 33 y.o. female who presents for Follow-up.    HPI     She overall has been going well.  The medication seems to be helping   She has been taking the first full dose of the medication for a while with the venlafaxine.  She would like to continue on the same dosage of the medication.    Her abdominal pain seems to also now be more on the right side.  She has also had good luck with intermittent fasting recently from a book that she has been reading.  It had been helping pretty well with her symptoms, but they have recently returned and she is not exactly sure as to why.  She states that this is not new or with having her abdominal pain more in her right upper quadrant as it had been more in the middle before.  She states that she is currently in a flare of a lot of the pain    She states that in the past she had previously been worked up for certain sorts of autoimmune diseases including lupus.  She states that she thinks she remembers having some test that ended up coming back negative.  She is not exactly sure who she had seen but she is not exactly sure if she had actually seen a physician for this.  She does not member the last time she has gotten tested.  She thinks that at 1 point she had been worked up by rheumatologist.    Review of Systems   Constitutional:  Negative for chills, fatigue and fever.   HENT:  Negative for congestion.    Gastrointestinal:  Negative for abdominal distention, constipation, diarrhea, nausea and vomiting.   Musculoskeletal:  Negative for arthralgias and myalgias.   Skin:  Positive for rash (Butterfly rash present on her face.).   Neurological:  Negative for dizziness and light-headedness.       Objective   /78   Pulse 61   Temp 36.6 °C (97.8 °F)   Ht 1.638 m (5' 4.5\")   Wt 77.6 kg (171 lb)   SpO2 98%   BMI 28.90 kg/m²     Physical Exam  Vitals and nursing note reviewed.   Constitutional:       General: She is not in acute " distress.     Appearance: Normal appearance. She is normal weight. She is not ill-appearing or toxic-appearing.   HENT:      Head: Normocephalic and atraumatic.   Cardiovascular:      Rate and Rhythm: Normal rate and regular rhythm.      Heart sounds: Normal heart sounds.   Pulmonary:      Effort: Pulmonary effort is normal.      Breath sounds: Normal breath sounds.   Abdominal:      General: Abdomen is flat. Bowel sounds are normal. There is no distension.      Palpations: Abdomen is soft.      Tenderness: There is abdominal tenderness (Right upper quadrant tenderness to palpation.). There is no right CVA tenderness, left CVA tenderness, guarding or rebound.      Hernia: No hernia is present.   Skin:     General: Skin is warm and dry.      Findings: Rash (Butterfly rash present on the face) present.   Neurological:      Mental Status: She is alert.         Assessment/Plan   Problem List Items Addressed This Visit             ICD-10-CM    Anxiety, generalized F41.1    Relevant Medications    venlafaxine XR (Effexor-XR) 75 mg 24 hr capsule    Recurrent major depressive disorder (CMS/HCC) F33.9    Relevant Medications    venlafaxine XR (Effexor-XR) 75 mg 24 hr capsule    Chronic fatigue R53.82    Relevant Orders    Food Allergy Profile IgE     Other Visit Diagnoses         Codes    Positive MICHELLE (antinuclear antibody)    -  Primary R76.8    Relevant Orders    MICHELLE + LD Panel    Sedimentation Rate    C-reactive protein    Food Allergy Profile IgE    Butterfly rash     R21    Relevant Orders    MICHELLE + LD Panel    Sedimentation Rate    C-reactive protein    Food Allergy Profile IgE    Right upper quadrant pain     R10.11    Relevant Orders    Comprehensive Metabolic Panel    Lipase    Amylase    US abdomen limited    Food Allergy Profile IgE    Epigastric pain     R10.13    Relevant Orders    US abdomen limited    Food Allergy Profile IgE    Food intolerance     K90.49    Relevant Orders    Food Allergy Profile IgE           History and physical examination as above.  Overall patient's recurrent depression and anxiety well-controlled on the venlafaxine.  Refill sent to the patient's pharmacy.  Patient has a history of a positive MICHELLE panel and previously worked up for lupus.  She currently has a butterfly rash.  She has not had any recent testing or confirmatory testing in a couple of years.  Patient with current flareup of her symptoms.  Reordered MICHELLE panel in addition to inflammatory markers as well as amylase and lipase.  Patient also mostly having right upper quadrant pain which is a change from previously when it was mostly epigastric and sometimes on the left side.  Tenderness to palpation in the right upper quadrant.  Stat ultrasound of the abdomen ordered.  Patient also having various food intolerances in addition to her fatigue and other symptoms.  She is interested in allergy testing at various foods to see if she is allergic to anything in particular.  Food allergy profile ordered.  Discussed signs and symptoms that would require further follow-up in the office.  Uncertain specific etiology as to her pain and her other symptoms.  If all testing is resulted negative, may require additional follow-up.  She has previously seen gastroenterology in the past for an EGD as well as a colonoscopy.  May workup gallbladder polyp further in the future if this could be a potential cause of some of her symptoms as well.

## 2024-03-05 ENCOUNTER — HOSPITAL ENCOUNTER (OUTPATIENT)
Dept: RADIOLOGY | Facility: CLINIC | Age: 34
Discharge: HOME | End: 2024-03-05
Payer: COMMERCIAL

## 2024-03-05 ENCOUNTER — LAB (OUTPATIENT)
Dept: LAB | Facility: LAB | Age: 34
End: 2024-03-05
Payer: COMMERCIAL

## 2024-03-05 DIAGNOSIS — E55.9 VITAMIN D INSUFFICIENCY: ICD-10-CM

## 2024-03-05 DIAGNOSIS — R76.8 POSITIVE ANA (ANTINUCLEAR ANTIBODY): ICD-10-CM

## 2024-03-05 DIAGNOSIS — R10.11 RIGHT UPPER QUADRANT PAIN: ICD-10-CM

## 2024-03-05 DIAGNOSIS — R10.13 EPIGASTRIC PAIN: ICD-10-CM

## 2024-03-05 DIAGNOSIS — R21 BUTTERFLY RASH: ICD-10-CM

## 2024-03-05 DIAGNOSIS — R53.82 CHRONIC FATIGUE: ICD-10-CM

## 2024-03-05 DIAGNOSIS — K90.49 FOOD INTOLERANCE: ICD-10-CM

## 2024-03-05 PROCEDURE — 86225 DNA ANTIBODY NATIVE: CPT

## 2024-03-05 PROCEDURE — 76705 ECHO EXAM OF ABDOMEN: CPT

## 2024-03-05 PROCEDURE — 36415 COLL VENOUS BLD VENIPUNCTURE: CPT

## 2024-03-05 PROCEDURE — 82306 VITAMIN D 25 HYDROXY: CPT

## 2024-03-05 PROCEDURE — 76705 ECHO EXAM OF ABDOMEN: CPT | Mod: FOREIGN READ | Performed by: RADIOLOGY

## 2024-03-05 PROCEDURE — 82150 ASSAY OF AMYLASE: CPT

## 2024-03-05 PROCEDURE — 86038 ANTINUCLEAR ANTIBODIES: CPT

## 2024-03-05 PROCEDURE — 83690 ASSAY OF LIPASE: CPT

## 2024-03-05 PROCEDURE — 86003 ALLG SPEC IGE CRUDE XTRC EA: CPT

## 2024-03-05 PROCEDURE — 86235 NUCLEAR ANTIGEN ANTIBODY: CPT

## 2024-03-05 PROCEDURE — 80053 COMPREHEN METABOLIC PANEL: CPT

## 2024-03-05 PROCEDURE — 85652 RBC SED RATE AUTOMATED: CPT

## 2024-03-05 PROCEDURE — 86140 C-REACTIVE PROTEIN: CPT

## 2024-03-06 LAB
25(OH)D3 SERPL-MCNC: 50 NG/ML (ref 30–100)
ALBUMIN SERPL BCP-MCNC: 4.5 G/DL (ref 3.4–5)
ALP SERPL-CCNC: 59 U/L (ref 33–110)
ALT SERPL W P-5'-P-CCNC: 27 U/L (ref 7–45)
AMYLASE SERPL-CCNC: 26 U/L (ref 29–103)
ANA SER QL HEP2 SUBST: NEGATIVE
ANION GAP SERPL CALC-SCNC: 15 MMOL/L (ref 10–20)
AST SERPL W P-5'-P-CCNC: 16 U/L (ref 9–39)
BILIRUB SERPL-MCNC: 0.9 MG/DL (ref 0–1.2)
BUN SERPL-MCNC: 15 MG/DL (ref 6–23)
CALCIUM SERPL-MCNC: 9.3 MG/DL (ref 8.6–10.6)
CENTROMERE B AB SER-ACNC: <0.2 AI
CHLORIDE SERPL-SCNC: 102 MMOL/L (ref 98–107)
CHROMATIN AB SERPL-ACNC: <0.2 AI
CLAM IGE QN: <0.1 KU/L
CO2 SERPL-SCNC: 27 MMOL/L (ref 21–32)
CODFISH IGE QN: <0.1 KU/L
CORN IGE QN: <0.1
CREAT SERPL-MCNC: 0.68 MG/DL (ref 0.5–1.05)
CRP SERPL-MCNC: 0.29 MG/DL
DSDNA AB SER-ACNC: 1 IU/ML
EGFRCR SERPLBLD CKD-EPI 2021: >90 ML/MIN/1.73M*2
EGG WHITE IGE QN: <0.1 KU/L
ENA JO1 AB SER QL IA: <0.2 AI
ENA RNP AB SER IA-ACNC: <0.2 AI
ENA SCL70 AB SER QL IA: <0.2 AI
ENA SM AB SER IA-ACNC: <0.2 AI
ENA SM+RNP AB SER QL IA: <0.2 AI
ENA SS-A AB SER IA-ACNC: <0.2 AI
ENA SS-B AB SER IA-ACNC: <0.2 AI
ERYTHROCYTE [SEDIMENTATION RATE] IN BLOOD BY WESTERGREN METHOD: 4 MM/H (ref 0–20)
GLUCOSE SERPL-MCNC: 85 MG/DL (ref 74–99)
LIPASE SERPL-CCNC: 31 U/L (ref 9–82)
MILK IGE QN: <0.1 KU/L
PEANUT IGE QN: <0.1 KU/L
POTASSIUM SERPL-SCNC: 4.3 MMOL/L (ref 3.5–5.3)
PROT SERPL-MCNC: 6.8 G/DL (ref 6.4–8.2)
RIBOSOMAL P AB SER-ACNC: <0.2 AI
SCALLOP IGE QN: <0.1 KU/L
SESAME SEED IGE QN: <0.1 KU/L
SHRIMP IGE QN: <0.1 KU/L
SODIUM SERPL-SCNC: 140 MMOL/L (ref 136–145)
SOYBEAN IGE QN: <0.1 KU/L
WALNUT IGE QN: <0.1 KU/L
WHEAT IGE QN: <0.1 KU/L

## 2024-08-12 ENCOUNTER — APPOINTMENT (OUTPATIENT)
Dept: PRIMARY CARE | Facility: CLINIC | Age: 34
End: 2024-08-12
Payer: COMMERCIAL

## 2024-08-12 VITALS
BODY MASS INDEX: 30.93 KG/M2 | DIASTOLIC BLOOD PRESSURE: 67 MMHG | OXYGEN SATURATION: 97 % | SYSTOLIC BLOOD PRESSURE: 102 MMHG | WEIGHT: 183 LBS | TEMPERATURE: 97.5 F | HEART RATE: 67 BPM

## 2024-08-12 DIAGNOSIS — J01.01 ACUTE RECURRENT MAXILLARY SINUSITIS: Primary | ICD-10-CM

## 2024-08-12 PROCEDURE — 99213 OFFICE O/P EST LOW 20 MIN: CPT | Performed by: STUDENT IN AN ORGANIZED HEALTH CARE EDUCATION/TRAINING PROGRAM

## 2024-08-12 RX ORDER — AMOXICILLIN AND CLAVULANATE POTASSIUM 875; 125 MG/1; MG/1
875 TABLET, FILM COATED ORAL 2 TIMES DAILY
Qty: 14 TABLET | Refills: 0 | Status: SHIPPED | OUTPATIENT
Start: 2024-08-12 | End: 2024-08-19

## 2024-08-12 ASSESSMENT — ENCOUNTER SYMPTOMS
CHILLS: 0
NAUSEA: 0
VOMITING: 0
DIARRHEA: 0
SINUS PRESSURE: 1
COUGH: 1
ABDOMINAL PAIN: 0
RHINORRHEA: 0
FATIGUE: 0
SINUS PAIN: 1
CONSTIPATION: 0
SHORTNESS OF BREATH: 0
FEVER: 0

## 2024-08-12 NOTE — PROGRESS NOTES
Subjective   Patient ID: Pamela Maya is a 33 y.o. female who presents for Sinusitis (X 3 weeks).    Sinusitis  Associated symptoms include congestion, coughing and sinus pressure. Pertinent negatives include no chills or shortness of breath.        She has been having sinus pain and pressure for the past few weeks.  She had a sinus infection back in May and was seen in an urgent care.  She had gotten an antibiotic which did help but it did not fully go away.  She does not remember which antibiotic it was but thinks this was a penicillin.  Still having increased post nasal drainage.  Her symptoms are worsening to what it was back in May.  Symptoms are more on the right side from her teeth and face and feels into her jaw.      Review of Systems   Constitutional:  Negative for chills, fatigue and fever.   HENT:  Positive for congestion, postnasal drip, sinus pressure and sinus pain. Negative for rhinorrhea.    Respiratory:  Positive for cough. Negative for shortness of breath.    Cardiovascular:  Negative for chest pain.   Gastrointestinal:  Negative for abdominal pain, constipation, diarrhea, nausea and vomiting.       Objective   /67   Pulse 67   Temp 36.4 °C (97.5 °F)   Wt 83 kg (183 lb)   SpO2 97%   BMI 30.93 kg/m²     Physical Exam  Vitals and nursing note reviewed.   Constitutional:       General: She is not in acute distress.     Appearance: Normal appearance. She is normal weight. She is not ill-appearing or toxic-appearing.   HENT:      Head: Normocephalic and atraumatic.      Right Ear: Tympanic membrane, ear canal and external ear normal.      Left Ear: Tympanic membrane, ear canal and external ear normal.      Nose: Congestion present.      Right Sinus: Maxillary sinus tenderness present. No frontal sinus tenderness.      Left Sinus: Maxillary sinus tenderness present. No frontal sinus tenderness.      Mouth/Throat:      Mouth: Mucous membranes are moist.   Cardiovascular:      Rate and  Rhythm: Normal rate and regular rhythm.      Heart sounds: Normal heart sounds.   Pulmonary:      Effort: Pulmonary effort is normal.      Breath sounds: Normal breath sounds.   Neurological:      Mental Status: She is alert.         Assessment/Plan   Problem List Items Addressed This Visit    None  Visit Diagnoses         Codes    Acute recurrent maxillary sinusitis    -  Primary J01.01    Relevant Medications    amoxicillin-pot clavulanate (Augmentin) 875-125 mg tablet    Other Relevant Orders    Referral to ENT          History and physical examination as above.  Patient with recurrent sinusitis.  Started on Augmentin for the patient.  She will call if she is not getting adequate relief.  Did go ahead and send in a referral for ENT as she has had this recurrently in the past.  Discussed signs and symptoms that would require reevaluation in the office versus immediate treatment in the emergency department.  She is understanding and in agreement with this plan.

## 2024-08-20 ENCOUNTER — OFFICE VISIT (OUTPATIENT)
Dept: PRIMARY CARE | Facility: CLINIC | Age: 34
End: 2024-08-20
Payer: COMMERCIAL

## 2024-08-20 VITALS
OXYGEN SATURATION: 97 % | DIASTOLIC BLOOD PRESSURE: 76 MMHG | WEIGHT: 181 LBS | SYSTOLIC BLOOD PRESSURE: 106 MMHG | TEMPERATURE: 97.2 F | BODY MASS INDEX: 30.59 KG/M2 | HEART RATE: 62 BPM

## 2024-08-20 DIAGNOSIS — J01.01 ACUTE RECURRENT MAXILLARY SINUSITIS: Primary | ICD-10-CM

## 2024-08-20 PROCEDURE — 99213 OFFICE O/P EST LOW 20 MIN: CPT | Performed by: STUDENT IN AN ORGANIZED HEALTH CARE EDUCATION/TRAINING PROGRAM

## 2024-08-20 RX ORDER — METHYLPREDNISOLONE 4 MG/1
TABLET ORAL
Qty: 21 TABLET | Refills: 0 | Status: SHIPPED | OUTPATIENT
Start: 2024-08-20 | End: 2024-08-27

## 2024-08-20 RX ORDER — DOXYCYCLINE 100 MG/1
100 CAPSULE ORAL 2 TIMES DAILY
Qty: 20 CAPSULE | Refills: 0 | Status: SHIPPED | OUTPATIENT
Start: 2024-08-20 | End: 2024-08-30

## 2024-08-20 ASSESSMENT — ENCOUNTER SYMPTOMS
FEVER: 0
VOMITING: 0
CONSTIPATION: 0
NAUSEA: 0
ABDOMINAL PAIN: 0
FATIGUE: 0
SINUS PAIN: 1
CHILLS: 0
RHINORRHEA: 0
SINUS PRESSURE: 1
COUGH: 1
SHORTNESS OF BREATH: 0
DIARRHEA: 0

## 2024-08-20 NOTE — PROGRESS NOTES
Subjective   Patient ID: Pamela Maya is a 33 y.o. female who presents for Follow-up (Still not feeling well after abx).    HPI     Most of her pain is still on the right side of the face and the sinuses.  Pain also in the right ear a well that is extending towards the neck.  She still denies any fevers or chills or fatigue but is still reporting most of the sinus symptoms.  She has also had a cough.  She is not taking anything else over the counter.    Review of Systems   Constitutional:  Negative for chills, fatigue and fever.   HENT:  Positive for congestion, ear pain (right), postnasal drip, sinus pressure and sinus pain. Negative for dental problem, ear discharge and rhinorrhea.    Respiratory:  Positive for cough. Negative for shortness of breath.    Cardiovascular:  Negative for chest pain.   Gastrointestinal:  Negative for abdominal pain, constipation, diarrhea, nausea and vomiting.       Objective   /76   Pulse 62   Temp 36.2 °C (97.2 °F)   Wt 82.1 kg (181 lb)   SpO2 97%   BMI 30.59 kg/m²     Physical Exam  Vitals and nursing note reviewed.   Constitutional:       General: She is not in acute distress.     Appearance: Normal appearance. She is normal weight. She is not ill-appearing or toxic-appearing.   HENT:      Head: Normocephalic and atraumatic.      Right Ear: Tympanic membrane, ear canal and external ear normal.      Left Ear: Tympanic membrane, ear canal and external ear normal.      Nose: Congestion present.      Right Sinus: Maxillary sinus tenderness present. No frontal sinus tenderness.      Left Sinus: Maxillary sinus tenderness present. No frontal sinus tenderness.      Mouth/Throat:      Mouth: Mucous membranes are moist.   Cardiovascular:      Rate and Rhythm: Normal rate and regular rhythm.      Heart sounds: Normal heart sounds.   Pulmonary:      Effort: Pulmonary effort is normal.      Breath sounds: Normal breath sounds.   Neurological:      Mental Status: She is alert.          Assessment/Plan   Problem List Items Addressed This Visit    None  Visit Diagnoses         Codes    Acute recurrent maxillary sinusitis    -  Primary J01.01    Relevant Medications    doxycycline (Vibramycin) 100 mg capsule    methylPREDNISolone (Medrol Dospak) 4 mg tablets    Other Relevant Orders    Referral to ENT          History and physical examination as above.  Patient with continued symptoms despite previous treatment with a course of Augmentin.  Will go ahead and retreat with Augmentin as well as given a steroid pack as well.  Patient was previously referred to ENT but they are scheduling currently into October.  Referral placed to different ENT to see if we can have her be evaluated sooner.  Discussed signs and symptoms that would prompt reevaluation in the office versus immediate treatment in the emergency department.  She is understanding and in agreement with this plan.

## 2024-08-20 NOTE — PATIENT INSTRUCTIONS
I went ahead and sent in an alternative treatment for your symptoms.  I did go ahead and send in for prescription for doxycycline for the next 10 days.  Will also go ahead and give you a steroid pack.  Please be careful with sun exposure while you are on the medication with the antibiotic.    I did place a new referral to ENT just to come to see when they are scheduling out to.  Will fax this over to their office.  Please call in the next week or so if you do not hear back from their office about getting scheduled.    Would recommend making sure you are eating foods containing increased amounts of probiotics or fermented foods while you are on any sort of antibiotic therapy.    Please call with any additional symptoms or concerns.    If symptoms significantly worsen, I would recommend follow-up in the emergency department.    Thank you

## 2024-09-14 DIAGNOSIS — F33.1 MODERATE EPISODE OF RECURRENT MAJOR DEPRESSIVE DISORDER: ICD-10-CM

## 2024-09-14 DIAGNOSIS — F41.1 ANXIETY, GENERALIZED: ICD-10-CM

## 2024-09-16 RX ORDER — VENLAFAXINE HYDROCHLORIDE 75 MG/1
75 CAPSULE, EXTENDED RELEASE ORAL DAILY
Qty: 90 CAPSULE | Refills: 1 | Status: SHIPPED | OUTPATIENT
Start: 2024-09-16 | End: 2025-03-15

## 2024-10-22 ENCOUNTER — OFFICE VISIT (OUTPATIENT)
Dept: PRIMARY CARE | Facility: CLINIC | Age: 34
End: 2024-10-22
Payer: COMMERCIAL

## 2024-10-22 VITALS
WEIGHT: 176 LBS | SYSTOLIC BLOOD PRESSURE: 108 MMHG | HEART RATE: 74 BPM | DIASTOLIC BLOOD PRESSURE: 74 MMHG | HEIGHT: 65 IN | RESPIRATION RATE: 18 BRPM | BODY MASS INDEX: 29.32 KG/M2 | OXYGEN SATURATION: 96 % | TEMPERATURE: 97.3 F

## 2024-10-22 DIAGNOSIS — G47.8 NON-RESTORATIVE SLEEP: ICD-10-CM

## 2024-10-22 DIAGNOSIS — R73.01 ABNORMAL FASTING GLUCOSE: ICD-10-CM

## 2024-10-22 DIAGNOSIS — R40.0 DAYTIME SOMNOLENCE: ICD-10-CM

## 2024-10-22 DIAGNOSIS — R51.9 ACHING HEADACHE: ICD-10-CM

## 2024-10-22 DIAGNOSIS — R53.83 FATIGUE, UNSPECIFIED TYPE: Primary | ICD-10-CM

## 2024-10-22 DIAGNOSIS — Z00.00 HEALTHCARE MAINTENANCE: ICD-10-CM

## 2024-10-22 PROBLEM — L71.9 ROSACEA: Status: ACTIVE | Noted: 2024-10-22

## 2024-10-22 PROBLEM — R10.9 LEFT FLANK PAIN: Status: ACTIVE | Noted: 2024-10-22

## 2024-10-22 PROBLEM — R14.0 ABDOMINAL BLOATING: Status: ACTIVE | Noted: 2024-10-22

## 2024-10-22 PROBLEM — G43.E09 CHRONIC MIGRAINE WITH AURA: Status: ACTIVE | Noted: 2024-10-22

## 2024-10-22 PROBLEM — G44.229 TENSION HEADACHE, CHRONIC: Status: ACTIVE | Noted: 2024-10-22

## 2024-10-22 PROBLEM — M54.2 NECK PAIN: Status: ACTIVE | Noted: 2024-10-22

## 2024-10-22 PROBLEM — M99.09 SEGMENTAL AND SOMATIC DYSFUNCTION: Status: ACTIVE | Noted: 2024-10-22

## 2024-10-22 PROBLEM — F41.9 ANXIETY: Status: ACTIVE | Noted: 2024-10-22

## 2024-10-22 PROBLEM — K90.0 ADULT FORM OF CELIAC DISEASE (HHS-HCC): Status: ACTIVE | Noted: 2024-10-22

## 2024-10-22 PROBLEM — G89.29 CHRONIC LEFT-SIDED LOW BACK PAIN: Status: ACTIVE | Noted: 2024-10-22

## 2024-10-22 PROBLEM — R87.69 ABNORMAL CYTOLOGICAL FINDINGS IN FEMALE GENITAL ORGANS: Status: ACTIVE | Noted: 2024-10-22

## 2024-10-22 PROBLEM — M54.50 CHRONIC LEFT-SIDED LOW BACK PAIN: Status: ACTIVE | Noted: 2024-10-22

## 2024-10-22 PROBLEM — R21 RASH AND OTHER NONSPECIFIC SKIN ERUPTION: Status: ACTIVE | Noted: 2018-11-30

## 2024-10-22 PROBLEM — B27.90 INFECTIOUS MONONUCLEOSIS: Status: ACTIVE | Noted: 2024-10-22

## 2024-10-22 PROBLEM — R76.8 POSITIVE ANTINUCLEAR ANTIBODY: Status: ACTIVE | Noted: 2024-10-22

## 2024-10-22 PROBLEM — U09.9 POST-ACUTE COVID-19 SYNDROME: Status: ACTIVE | Noted: 2024-10-22

## 2024-10-22 PROBLEM — L85.3 XEROSIS CUTIS: Status: ACTIVE | Noted: 2018-11-30

## 2024-10-22 PROBLEM — Z86.69 HISTORY OF MIGRAINE: Status: ACTIVE | Noted: 2024-10-22

## 2024-10-22 PROBLEM — M54.9 BACK PAIN: Status: ACTIVE | Noted: 2024-10-22

## 2024-10-22 PROBLEM — R76.8 ANA POSITIVE: Status: ACTIVE | Noted: 2024-10-22

## 2024-10-22 PROBLEM — R76.8 POSITIVE AUTOANTIBODY SCREENING FOR CELIAC DISEASE: Status: ACTIVE | Noted: 2024-10-22

## 2024-10-22 PROCEDURE — 99213 OFFICE O/P EST LOW 20 MIN: CPT | Performed by: STUDENT IN AN ORGANIZED HEALTH CARE EDUCATION/TRAINING PROGRAM

## 2024-10-22 PROCEDURE — 3008F BODY MASS INDEX DOCD: CPT | Performed by: STUDENT IN AN ORGANIZED HEALTH CARE EDUCATION/TRAINING PROGRAM

## 2024-10-22 ASSESSMENT — ENCOUNTER SYMPTOMS
ARTHRALGIAS: 0
FEVER: 0
CHILLS: 0
FATIGUE: 1
HEADACHES: 1
COUGH: 0
SHORTNESS OF BREATH: 0
LIGHT-HEADEDNESS: 0
MYALGIAS: 0
DIZZINESS: 0

## 2024-10-22 ASSESSMENT — PATIENT HEALTH QUESTIONNAIRE - PHQ9
SUM OF ALL RESPONSES TO PHQ9 QUESTIONS 1 & 2: 0
1. LITTLE INTEREST OR PLEASURE IN DOING THINGS: NOT AT ALL
2. FEELING DOWN, DEPRESSED OR HOPELESS: NOT AT ALL

## 2024-10-22 NOTE — PROGRESS NOTES
"Subjective   Patient ID: Pamela Maya is a 33 y.o. female who presents for Fatigue and Headache.    Fatigue  Associated symptoms include fatigue and headaches. Pertinent negatives include no arthralgias, chest pain, chills, congestion, coughing, fever or myalgias.   Headache   Pertinent negatives include no coughing, dizziness or fever.       She is still following with the ENT who had diagnosed her with a deviated septum and then also ordered a sleep study.  She has not received anything from the company that they sent it to and has not received any follow-up from ENT.  She is interested if we can just go ahead and place the order for her.    She thinks that she had COVID about 2 weeks ago. She has been testing negative.  She has been really fatigued.  This feels the same as when she had COVID in the past.  She is not sure why her symptoms most the time have been persisting and interested in possibly getting additional lab work.      Review of Systems   Constitutional:  Positive for fatigue. Negative for chills and fever.   HENT:  Negative for congestion.    Respiratory:  Negative for cough and shortness of breath.    Cardiovascular:  Negative for chest pain.   Musculoskeletal:  Negative for arthralgias and myalgias.   Neurological:  Positive for headaches. Negative for dizziness and light-headedness.       Objective   /74 (BP Location: Left arm, Patient Position: Sitting, BP Cuff Size: Adult)   Pulse 74   Temp 36.3 °C (97.3 °F) (Skin)   Resp 18   Ht 1.638 m (5' 4.5\")   Wt 79.8 kg (176 lb)   SpO2 96%   BMI 29.74 kg/m²     Physical Exam  Vitals and nursing note reviewed.   Constitutional:       General: She is not in acute distress.     Appearance: Normal appearance. She is normal weight. She is not ill-appearing or toxic-appearing.   HENT:      Head: Normocephalic and atraumatic.   Cardiovascular:      Rate and Rhythm: Normal rate and regular rhythm.      Heart sounds: Normal heart sounds. "   Pulmonary:      Effort: Pulmonary effort is normal.      Breath sounds: Normal breath sounds.   Neurological:      Mental Status: She is alert.         Assessment/Plan   Problem List Items Addressed This Visit    None  Visit Diagnoses         Codes    Fatigue, unspecified type    -  Primary R53.83    Relevant Orders    CBC and Auto Differential (Completed)    Comprehensive Metabolic Panel (Completed)    Home sleep apnea test (HSAT)    Hemoglobin A1C (Completed)    Iron and TIBC (Completed)    Ferritin (Completed)    Vitamin B12 (Completed)    Folate (Completed)    Thyroid Stimulating Hormone (Completed)    Thyroxine, Free (Completed)    Aching headache     R51.9    Daytime somnolence     R40.0    Relevant Orders    Home sleep apnea test (HSAT)    Non-restorative sleep     G47.8    Relevant Orders    Home sleep apnea test (HSAT)    Abnormal fasting glucose     R73.01    Relevant Orders    Hemoglobin A1C (Completed)    Healthcare maintenance     Z00.00    Relevant Orders    Lipid Panel (Completed)    Vitamin D 25-Hydroxy,Total (for eval of Vitamin D levels) (Completed)    Vitamin B12 (Completed)    Thyroid Stimulating Hormone (Completed)    Thyroxine, Free (Completed)          History and physical examination as above.  Patient presenting with persistent symptoms as well as thinking that she had COVID recently over the past couple of weeks.  Patient is due anyway for regular lab work.  Lab work panels ordered for the patient and she will get these done fasting.  She is also not gotten any correspondence back from ENT in regards to her sleep study.  Placed a sleep study for the patient and she will call in the next 1 to 2 weeks if she has not received any correspondence.  We will plan on going over the results of the blood work with her.  We will plan follow-up based on the results.

## 2024-10-22 NOTE — PATIENT INSTRUCTIONS
I went ahead and ordered lab work for you.  Please make sure to get it done fasting.  We will make sure that we follow-up on the results.  [facility]    We went ahead and ordered a sleep study for you.  We will go ahead and fax this order.  Please let me know in the next week or 2 if you have not heard from the company if you have not gotten any correspondence or follow-up for the sleep study.    Please call with any additional questions or concerns.    Thank you

## 2024-10-23 ENCOUNTER — APPOINTMENT (OUTPATIENT)
Dept: OTOLARYNGOLOGY | Facility: CLINIC | Age: 34
End: 2024-10-23
Payer: COMMERCIAL

## 2024-10-26 ENCOUNTER — LAB (OUTPATIENT)
Dept: LAB | Facility: LAB | Age: 34
End: 2024-10-26
Payer: COMMERCIAL

## 2024-10-26 DIAGNOSIS — R73.01 ABNORMAL FASTING GLUCOSE: ICD-10-CM

## 2024-10-26 DIAGNOSIS — Z00.00 HEALTHCARE MAINTENANCE: ICD-10-CM

## 2024-10-26 DIAGNOSIS — R53.83 FATIGUE, UNSPECIFIED TYPE: ICD-10-CM

## 2024-10-26 PROCEDURE — 82746 ASSAY OF FOLIC ACID SERUM: CPT

## 2024-10-26 PROCEDURE — 83540 ASSAY OF IRON: CPT

## 2024-10-26 PROCEDURE — 82728 ASSAY OF FERRITIN: CPT

## 2024-10-26 PROCEDURE — 82306 VITAMIN D 25 HYDROXY: CPT

## 2024-10-26 PROCEDURE — 84443 ASSAY THYROID STIM HORMONE: CPT

## 2024-10-26 PROCEDURE — 83550 IRON BINDING TEST: CPT

## 2024-10-26 PROCEDURE — 80053 COMPREHEN METABOLIC PANEL: CPT

## 2024-10-26 PROCEDURE — 85025 COMPLETE CBC W/AUTO DIFF WBC: CPT

## 2024-10-26 PROCEDURE — 36415 COLL VENOUS BLD VENIPUNCTURE: CPT

## 2024-10-26 PROCEDURE — 80061 LIPID PANEL: CPT

## 2024-10-26 PROCEDURE — 84439 ASSAY OF FREE THYROXINE: CPT

## 2024-10-26 PROCEDURE — 83036 HEMOGLOBIN GLYCOSYLATED A1C: CPT

## 2024-10-26 PROCEDURE — 82607 VITAMIN B-12: CPT

## 2024-10-27 LAB
25(OH)D3 SERPL-MCNC: 35 NG/ML (ref 30–100)
ALBUMIN SERPL BCP-MCNC: 4.6 G/DL (ref 3.4–5)
ALP SERPL-CCNC: 46 U/L (ref 33–110)
ALT SERPL W P-5'-P-CCNC: 25 U/L (ref 7–45)
ANION GAP SERPL CALC-SCNC: 11 MMOL/L (ref 10–20)
AST SERPL W P-5'-P-CCNC: 16 U/L (ref 9–39)
BASOPHILS # BLD AUTO: 0.06 X10*3/UL (ref 0–0.1)
BASOPHILS NFR BLD AUTO: 0.9 %
BILIRUB SERPL-MCNC: 0.5 MG/DL (ref 0–1.2)
BUN SERPL-MCNC: 13 MG/DL (ref 6–23)
CALCIUM SERPL-MCNC: 8.8 MG/DL (ref 8.6–10.6)
CHLORIDE SERPL-SCNC: 103 MMOL/L (ref 98–107)
CHOLEST SERPL-MCNC: 153 MG/DL (ref 0–199)
CHOLESTEROL/HDL RATIO: 2.9
CO2 SERPL-SCNC: 29 MMOL/L (ref 21–32)
CREAT SERPL-MCNC: 0.78 MG/DL (ref 0.5–1.05)
EGFRCR SERPLBLD CKD-EPI 2021: >90 ML/MIN/1.73M*2
EOSINOPHIL # BLD AUTO: 0.18 X10*3/UL (ref 0–0.7)
EOSINOPHIL NFR BLD AUTO: 2.8 %
ERYTHROCYTE [DISTWIDTH] IN BLOOD BY AUTOMATED COUNT: 12 % (ref 11.5–14.5)
EST. AVERAGE GLUCOSE BLD GHB EST-MCNC: 103 MG/DL
FERRITIN SERPL-MCNC: 49 NG/ML (ref 8–150)
FOLATE SERPL-MCNC: 13.9 NG/ML
GLUCOSE SERPL-MCNC: 96 MG/DL (ref 74–99)
HBA1C MFR BLD: 5.2 %
HCT VFR BLD AUTO: 42.8 % (ref 36–46)
HDLC SERPL-MCNC: 52.9 MG/DL
HGB BLD-MCNC: 14.4 G/DL (ref 12–16)
IMM GRANULOCYTES # BLD AUTO: 0.02 X10*3/UL (ref 0–0.7)
IMM GRANULOCYTES NFR BLD AUTO: 0.3 % (ref 0–0.9)
IRON SATN MFR SERPL: 37 % (ref 25–45)
IRON SERPL-MCNC: 146 UG/DL (ref 35–150)
LDLC SERPL CALC-MCNC: 69 MG/DL
LYMPHOCYTES # BLD AUTO: 2.55 X10*3/UL (ref 1.2–4.8)
LYMPHOCYTES NFR BLD AUTO: 39.9 %
MCH RBC QN AUTO: 30.8 PG (ref 26–34)
MCHC RBC AUTO-ENTMCNC: 33.6 G/DL (ref 32–36)
MCV RBC AUTO: 92 FL (ref 80–100)
MONOCYTES # BLD AUTO: 0.44 X10*3/UL (ref 0.1–1)
MONOCYTES NFR BLD AUTO: 6.9 %
NEUTROPHILS # BLD AUTO: 3.14 X10*3/UL (ref 1.2–7.7)
NEUTROPHILS NFR BLD AUTO: 49.2 %
NON HDL CHOLESTEROL: 100 MG/DL (ref 0–149)
NRBC BLD-RTO: 0 /100 WBCS (ref 0–0)
PLATELET # BLD AUTO: 377 X10*3/UL (ref 150–450)
POTASSIUM SERPL-SCNC: 4.3 MMOL/L (ref 3.5–5.3)
PROT SERPL-MCNC: 6.8 G/DL (ref 6.4–8.2)
RBC # BLD AUTO: 4.68 X10*6/UL (ref 4–5.2)
SODIUM SERPL-SCNC: 139 MMOL/L (ref 136–145)
T4 FREE SERPL-MCNC: 1.09 NG/DL (ref 0.78–1.48)
TIBC SERPL-MCNC: 390 UG/DL (ref 240–445)
TRIGL SERPL-MCNC: 157 MG/DL (ref 0–149)
TSH SERPL-ACNC: 1.95 MIU/L (ref 0.44–3.98)
UIBC SERPL-MCNC: 244 UG/DL (ref 110–370)
VIT B12 SERPL-MCNC: 701 PG/ML (ref 211–911)
VLDL: 31 MG/DL (ref 0–40)
WBC # BLD AUTO: 6.4 X10*3/UL (ref 4.4–11.3)

## 2024-10-29 ENCOUNTER — APPOINTMENT (OUTPATIENT)
Dept: PRIMARY CARE | Facility: CLINIC | Age: 34
End: 2024-10-29
Payer: COMMERCIAL

## 2025-02-01 ENCOUNTER — TELEPHONE (OUTPATIENT)
Dept: PRIMARY CARE | Facility: CLINIC | Age: 35
End: 2025-02-01
Payer: COMMERCIAL

## 2025-02-01 DIAGNOSIS — F41.1 ANXIETY, GENERALIZED: ICD-10-CM

## 2025-02-01 DIAGNOSIS — F33.1 MODERATE EPISODE OF RECURRENT MAJOR DEPRESSIVE DISORDER: ICD-10-CM

## 2025-02-01 RX ORDER — VENLAFAXINE HYDROCHLORIDE 75 MG/1
75 CAPSULE, EXTENDED RELEASE ORAL DAILY
Qty: 90 CAPSULE | Refills: 0 | Status: SHIPPED | OUTPATIENT
Start: 2025-02-01 | End: 2025-07-31

## 2025-02-01 NOTE — TELEPHONE ENCOUNTER
Pt needs a refill   venlafaxine XR (Effexor-XR) 75 mg 24 hr capsule     [facility]pharmacy #[id number], [facility] [address]   Phone: [phone]      Pt is completely out

## 2025-02-18 ENCOUNTER — APPOINTMENT (OUTPATIENT)
Dept: PRIMARY CARE | Facility: CLINIC | Age: 35
End: 2025-02-18
Payer: COMMERCIAL

## 2025-02-18 DIAGNOSIS — F41.1 ANXIETY, GENERALIZED: ICD-10-CM

## 2025-02-18 DIAGNOSIS — F33.9 EPISODE OF RECURRENT MAJOR DEPRESSIVE DISORDER, UNSPECIFIED DEPRESSION EPISODE SEVERITY (CMS-HCC): Primary | ICD-10-CM

## 2025-02-18 PROCEDURE — 99213 OFFICE O/P EST LOW 20 MIN: CPT | Performed by: STUDENT IN AN ORGANIZED HEALTH CARE EDUCATION/TRAINING PROGRAM

## 2025-02-18 RX ORDER — VENLAFAXINE HYDROCHLORIDE 37.5 MG/1
37.5 CAPSULE, EXTENDED RELEASE ORAL DAILY
Qty: 30 CAPSULE | Refills: 1 | Status: SHIPPED | OUTPATIENT
Start: 2025-02-18 | End: 2025-04-19

## 2025-02-18 ASSESSMENT — ENCOUNTER SYMPTOMS
DEPRESSION: 1
VOMITING: 0
FATIGUE: 0
SHORTNESS OF BREATH: 0
ABDOMINAL PAIN: 0
RHINORRHEA: 0
CHILLS: 0
NAUSEA: 0
NERVOUS/ANXIOUS: 1
DYSPHORIC MOOD: 1
FEVER: 0
LOSS OF SENSATION IN FEET: 0
DIARRHEA: 0
CONSTIPATION: 0
OCCASIONAL FEELINGS OF UNSTEADINESS: 0
PALPITATIONS: 0
COUGH: 0

## 2025-02-18 ASSESSMENT — PATIENT HEALTH QUESTIONNAIRE - PHQ9
6. FEELING BAD ABOUT YOURSELF - OR THAT YOU ARE A FAILURE OR HAVE LET YOURSELF OR YOUR FAMILY DOWN: SEVERAL DAYS
SUM OF ALL RESPONSES TO PHQ9 QUESTIONS 1 AND 2: 4
SUM OF ALL RESPONSES TO PHQ QUESTIONS 1-9: 16
4. FEELING TIRED OR HAVING LITTLE ENERGY: NEARLY EVERY DAY
10. IF YOU CHECKED OFF ANY PROBLEMS, HOW DIFFICULT HAVE THESE PROBLEMS MADE IT FOR YOU TO DO YOUR WORK, TAKE CARE OF THINGS AT HOME, OR GET ALONG WITH OTHER PEOPLE: VERY DIFFICULT
5. POOR APPETITE OR OVEREATING: NOT AT ALL
9. THOUGHTS THAT YOU WOULD BE BETTER OFF DEAD, OR OF HURTING YOURSELF: NOT AT ALL
3. TROUBLE FALLING OR STAYING ASLEEP OR SLEEPING TOO MUCH: NEARLY EVERY DAY
8. MOVING OR SPEAKING SO SLOWLY THAT OTHER PEOPLE COULD HAVE NOTICED. OR THE OPPOSITE, BEING SO FIGETY OR RESTLESS THAT YOU HAVE BEEN MOVING AROUND A LOT MORE THAN USUAL: NEARLY EVERY DAY
2. FEELING DOWN, DEPRESSED OR HOPELESS: MORE THAN HALF THE DAYS
7. TROUBLE CONCENTRATING ON THINGS, SUCH AS READING THE NEWSPAPER OR WATCHING TELEVISION: MORE THAN HALF THE DAYS
1. LITTLE INTEREST OR PLEASURE IN DOING THINGS: MORE THAN HALF THE DAYS

## 2025-02-18 NOTE — PROGRESS NOTES
Subjective   Patient ID: Pamela Maya is a 34 y.o. female who presents for Follow-up (venlafaxin ).    HPI     She is following up on her venlafaxine.  They had moved and has been under more stress.    She had also misplaced the medication and needed to call for a refill urgently.  She is restarted back on the medication but did not like the withdrawal. She also feels like it might not really be working as much at this time.    She did see dermatology with [facility] with Dr. Garcia.  They are doing additional workup due to her facial rash to make sure that she does not have lupus.  Previous blood testing for rheumatologic concern had been negative.    Review of Systems   Constitutional:  Negative for chills, fatigue and fever.   HENT:  Negative for congestion and rhinorrhea.    Respiratory:  Negative for cough and shortness of breath.    Cardiovascular:  Negative for chest pain and palpitations.   Gastrointestinal:  Negative for abdominal pain, constipation, diarrhea, nausea and vomiting.   Psychiatric/Behavioral:  Positive for dysphoric mood. The patient is nervous/anxious.        Objective   There were no vitals taken for this visit.    Physical Exam  Constitutional:       General: She is not in acute distress.     Appearance: Normal appearance. She is normal weight. She is not ill-appearing or toxic-appearing.   HENT:      Head: Normocephalic and atraumatic.   Neurological:      Mental Status: She is alert.         Assessment/Plan   Problem List Items Addressed This Visit             ICD-10-CM    Anxiety, generalized F41.1    Relevant Medications    venlafaxine XR (Effexor-XR) 37.5 mg 24 hr capsule    Recurrent major depressive disorder (CMS-HCC) - Primary F33.9    Relevant Medications    venlafaxine XR (Effexor-XR) 37.5 mg 24 hr capsule     History and limited physical examination as above due to virtual visit.  Discussed patient's persistent anxiety and recurrent depression.  Patient is not sure  if she ultimately wants to stay on the venlafaxine.  She has been under a lot more stress recently and is also not very sure about switching medications.  Discussed multiple different options including possibly switching medications as well as possibly weaning down and doing a cross taper versus keeping on the same medication with a possible dose adjustment.  She would like to stay on the same medication for now and get a slightly increased dose at least while she is under more stress.  She ultimately would like to try to come off of the medication.  Discussed signs and symptoms that would require reevaluation in the office versus immediate treatment in the emergency department.  She is understanding and in agreement with this plan.    This visit was completed via telemedicine (video) call due to the restrictions of the COVID global pandemic. The visit was conducted between Pamela Maya, location [place]. and myself, Johan Castro DO, location [place]. The patient verbally consented to the telehealth visit and verbally confirmed their location. All issues were discussed and addressed as in the clinical documentation, but no physical exam was performed. If it was felt that the patient should be evaluated in a clinical setting, then they were directed there.  Spent 23:55 minutes with the patient over the telemedicine call and more than 50% of this time was spent in counseling and coordination of care.

## 2025-03-12 DIAGNOSIS — F41.1 ANXIETY, GENERALIZED: ICD-10-CM

## 2025-03-12 DIAGNOSIS — F33.9 EPISODE OF RECURRENT MAJOR DEPRESSIVE DISORDER, UNSPECIFIED DEPRESSION EPISODE SEVERITY (CMS-HCC): ICD-10-CM

## 2025-03-13 ENCOUNTER — OFFICE VISIT (OUTPATIENT)
Dept: PRIMARY CARE | Facility: CLINIC | Age: 35
End: 2025-03-13
Payer: COMMERCIAL

## 2025-03-13 VITALS
WEIGHT: 190.8 LBS | OXYGEN SATURATION: 98 % | BODY MASS INDEX: 31.79 KG/M2 | HEIGHT: 65 IN | HEART RATE: 81 BPM | RESPIRATION RATE: 18 BRPM | DIASTOLIC BLOOD PRESSURE: 70 MMHG | SYSTOLIC BLOOD PRESSURE: 118 MMHG

## 2025-03-13 DIAGNOSIS — J04.0 LARYNGITIS: ICD-10-CM

## 2025-03-13 DIAGNOSIS — M54.2 ANTERIOR NECK PAIN: Primary | ICD-10-CM

## 2025-03-13 DIAGNOSIS — J02.9 SORE THROAT: ICD-10-CM

## 2025-03-13 LAB — POC RAPID STREP: NEGATIVE

## 2025-03-13 PROCEDURE — 99213 OFFICE O/P EST LOW 20 MIN: CPT | Performed by: STUDENT IN AN ORGANIZED HEALTH CARE EDUCATION/TRAINING PROGRAM

## 2025-03-13 PROCEDURE — 87880 STREP A ASSAY W/OPTIC: CPT | Performed by: STUDENT IN AN ORGANIZED HEALTH CARE EDUCATION/TRAINING PROGRAM

## 2025-03-13 PROCEDURE — 3008F BODY MASS INDEX DOCD: CPT | Performed by: STUDENT IN AN ORGANIZED HEALTH CARE EDUCATION/TRAINING PROGRAM

## 2025-03-13 RX ORDER — METHYLPREDNISOLONE 4 MG/1
TABLET ORAL
Qty: 21 TABLET | Refills: 0 | Status: SHIPPED | OUTPATIENT
Start: 2025-03-13 | End: 2025-03-19

## 2025-03-13 RX ORDER — VENLAFAXINE HYDROCHLORIDE 37.5 MG/1
37.5 CAPSULE, EXTENDED RELEASE ORAL DAILY
Qty: 90 CAPSULE | Refills: 0 | Status: SHIPPED | OUTPATIENT
Start: 2025-03-13 | End: 2025-06-11

## 2025-03-13 ASSESSMENT — ENCOUNTER SYMPTOMS
SHORTNESS OF BREATH: 0
ABDOMINAL PAIN: 0
FATIGUE: 0
NAUSEA: 0
DIARRHEA: 0
FEVER: 0
CONSTIPATION: 0
CHILLS: 0
COUGH: 1
SORE THROAT: 1
VOMITING: 0

## 2025-03-13 NOTE — PROGRESS NOTES
"Subjective   Patient ID: Pamela Maya is a 34 y.o. female who presents for Sore Throat (3 days) and Cough.    Sore Throat   Associated symptoms include coughing. Pertinent negatives include no abdominal pain, congestion, diarrhea, shortness of breath or vomiting.   Cough  Associated symptoms include a sore throat. Pertinent negatives include no chest pain, chills, fever or shortness of breath.        Symptoms started about 3 days ago.  She states that it had started with a sore throat and was having a little bit of a cough.  She then lost her voice.  It does feel like she is getting drainage down the back of her throat.  She does also report some pain in the front portion of her neck over what she thinks of her vocal cords.  She has not had that before in the past.  She denies any other recent illness.  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    Review of Systems   Constitutional:  Negative for chills, fatigue and fever.   HENT:  Positive for sore throat. Negative for congestion.    Respiratory:  Positive for cough. Negative for shortness of breath.    Cardiovascular:  Negative for chest pain.   Gastrointestinal:  Negative for abdominal pain, constipation, diarrhea, nausea and vomiting.       Objective   /70 (BP Location: Left arm, Patient Position: Sitting, BP Cuff Size: Large adult)   Pulse 81   Resp 18   Ht 1.638 m (5' 4.5\")   Wt 86.5 kg (190 lb 12.8 oz)   SpO2 98%   BMI 32.24 kg/m²     Physical Exam    Assessment/Plan   Problem List Items Addressed This Visit    None  Visit Diagnoses         Codes    Anterior neck pain    -  Primary M54.2    Relevant Medications    methylPREDNISolone (Medrol Dospak) 4 mg tablets    Other Relevant Orders    Thyroid Stimulating Hormone    Thyroxine, Free    Triiodothyronine, Total    Sedimentation Rate    C-Reactive Protein    Sore throat     J02.9    Relevant Medications    methylPREDNISolone (Medrol Dospak) " 4 mg tablets    Other Relevant Orders    POCT Rapid Strep A manually resulted (Completed)    CBC and Auto Differential    Comprehensive Metabolic Panel    Laryngitis     J04.0    Relevant Medications    methylPREDNISolone (Medrol Dospak) 4 mg tablets                agreement with this plan.

## 2025-03-14 LAB
ALBUMIN SERPL-MCNC: 4.6 G/DL (ref 3.6–5.1)
ALP SERPL-CCNC: 55 U/L (ref 31–125)
ALT SERPL-CCNC: 20 U/L (ref 6–29)
ANION GAP SERPL CALCULATED.4IONS-SCNC: 9 MMOL/L (CALC) (ref 7–17)
AST SERPL-CCNC: 17 U/L (ref 10–30)
BASOPHILS # BLD AUTO: 38 CELLS/UL (ref 0–200)
BASOPHILS NFR BLD AUTO: 0.4 %
BILIRUB SERPL-MCNC: 0.4 MG/DL (ref 0.2–1.2)
BUN SERPL-MCNC: 10 MG/DL (ref 7–25)
CALCIUM SERPL-MCNC: 8.8 MG/DL (ref 8.6–10.2)
CHLORIDE SERPL-SCNC: 105 MMOL/L (ref 98–110)
CO2 SERPL-SCNC: 26 MMOL/L (ref 20–32)
CREAT SERPL-MCNC: 0.61 MG/DL (ref 0.5–0.97)
CRP SERPL-MCNC: 9.6 MG/L
EGFRCR SERPLBLD CKD-EPI 2021: 121 ML/MIN/1.73M2
EOSINOPHIL # BLD AUTO: 221 CELLS/UL (ref 15–500)
EOSINOPHIL NFR BLD AUTO: 2.3 %
ERYTHROCYTE [DISTWIDTH] IN BLOOD BY AUTOMATED COUNT: 12.4 % (ref 11–15)
ERYTHROCYTE [SEDIMENTATION RATE] IN BLOOD BY WESTERGREN METHOD: 6 MM/H
GLUCOSE SERPL-MCNC: 101 MG/DL (ref 65–139)
HCT VFR BLD AUTO: 41.2 % (ref 35–45)
HGB BLD-MCNC: 14.3 G/DL (ref 11.7–15.5)
LYMPHOCYTES # BLD AUTO: 2362 CELLS/UL (ref 850–3900)
LYMPHOCYTES NFR BLD AUTO: 24.6 %
MCH RBC QN AUTO: 30.6 PG (ref 27–33)
MCHC RBC AUTO-ENTMCNC: 34.7 G/DL (ref 32–36)
MCV RBC AUTO: 88 FL (ref 80–100)
MONOCYTES # BLD AUTO: 672 CELLS/UL (ref 200–950)
MONOCYTES NFR BLD AUTO: 7 %
NEUTROPHILS # BLD AUTO: 6307 CELLS/UL (ref 1500–7800)
NEUTROPHILS NFR BLD AUTO: 65.7 %
PLATELET # BLD AUTO: 363 THOUSAND/UL (ref 140–400)
PMV BLD REES-ECKER: 8.9 FL (ref 7.5–12.5)
POTASSIUM SERPL-SCNC: 3.8 MMOL/L (ref 3.5–5.3)
PROT SERPL-MCNC: 6.9 G/DL (ref 6.1–8.1)
RBC # BLD AUTO: 4.68 MILLION/UL (ref 3.8–5.1)
SODIUM SERPL-SCNC: 140 MMOL/L (ref 135–146)
T3 SERPL-MCNC: 123 NG/DL (ref 76–181)
T4 FREE SERPL-MCNC: 1.1 NG/DL (ref 0.8–1.8)
TSH SERPL-ACNC: 1.66 MIU/L
WBC # BLD AUTO: 9.6 THOUSAND/UL (ref 3.8–10.8)

## 2025-03-15 LAB — S PYO THROAT QL CULT: NORMAL

## 2025-03-23 ASSESSMENT — ENCOUNTER SYMPTOMS
VOICE CHANGE: 1
TROUBLE SWALLOWING: 0

## 2025-03-24 DIAGNOSIS — J02.9 SORE THROAT: Primary | ICD-10-CM

## 2025-03-24 DIAGNOSIS — J04.0 LARYNGITIS: ICD-10-CM

## 2025-03-24 RX ORDER — AZITHROMYCIN 250 MG/1
TABLET, FILM COATED ORAL
Qty: 6 TABLET | Refills: 0 | Status: SHIPPED | OUTPATIENT
Start: 2025-03-24 | End: 2025-03-29

## 2025-03-27 RX ORDER — AMOXICILLIN AND CLAVULANATE POTASSIUM 875; 125 MG/1; MG/1
875 TABLET, FILM COATED ORAL 2 TIMES DAILY
Qty: 20 TABLET | Refills: 0 | Status: SHIPPED | OUTPATIENT
Start: 2025-03-27 | End: 2025-04-06

## 2025-03-28 LAB — CRP SERPL-MCNC: <3 MG/L

## 2025-04-10 LAB — NON-UH HIE GP HPV: NEGATIVE

## 2025-04-15 LAB — NON-UH HIE THINPREP TIS PAP: NORMAL

## 2025-04-26 DIAGNOSIS — F41.1 ANXIETY, GENERALIZED: ICD-10-CM

## 2025-04-26 DIAGNOSIS — F33.1 MODERATE EPISODE OF RECURRENT MAJOR DEPRESSIVE DISORDER: ICD-10-CM

## 2025-04-26 RX ORDER — VENLAFAXINE HYDROCHLORIDE 75 MG/1
75 CAPSULE, EXTENDED RELEASE ORAL DAILY
Qty: 90 CAPSULE | Refills: 0 | Status: SHIPPED | OUTPATIENT
Start: 2025-04-26 | End: 2025-10-23

## 2025-05-12 ENCOUNTER — APPOINTMENT (OUTPATIENT)
Dept: PRIMARY CARE | Facility: CLINIC | Age: 35
End: 2025-05-12
Payer: COMMERCIAL

## 2025-05-12 DIAGNOSIS — R47.89 WORD FINDING DIFFICULTY: ICD-10-CM

## 2025-05-12 DIAGNOSIS — R53.82 CHRONIC FATIGUE: Primary | ICD-10-CM

## 2025-05-12 DIAGNOSIS — R20.2 NUMBNESS AND TINGLING OF FOOT: ICD-10-CM

## 2025-05-12 DIAGNOSIS — F33.1 MODERATE EPISODE OF RECURRENT MAJOR DEPRESSIVE DISORDER: ICD-10-CM

## 2025-05-12 DIAGNOSIS — R20.0 NUMBNESS AND TINGLING OF FOOT: ICD-10-CM

## 2025-05-12 DIAGNOSIS — K13.79 MOUTH SORES: ICD-10-CM

## 2025-05-12 DIAGNOSIS — H53.9 VISION CHANGES: ICD-10-CM

## 2025-05-12 DIAGNOSIS — R20.2 NUMBNESS AND TINGLING IN RIGHT HAND: ICD-10-CM

## 2025-05-12 DIAGNOSIS — R41.0 CONFUSION: ICD-10-CM

## 2025-05-12 DIAGNOSIS — M25.50 MULTIPLE JOINT PAIN: ICD-10-CM

## 2025-05-12 DIAGNOSIS — F41.1 ANXIETY, GENERALIZED: ICD-10-CM

## 2025-05-12 DIAGNOSIS — R20.0 NUMBNESS AND TINGLING IN RIGHT HAND: ICD-10-CM

## 2025-05-12 DIAGNOSIS — R53.83 FATIGUE, UNSPECIFIED TYPE: ICD-10-CM

## 2025-05-12 PROCEDURE — 99214 OFFICE O/P EST MOD 30 MIN: CPT | Performed by: STUDENT IN AN ORGANIZED HEALTH CARE EDUCATION/TRAINING PROGRAM

## 2025-05-12 PROCEDURE — 1036F TOBACCO NON-USER: CPT | Performed by: STUDENT IN AN ORGANIZED HEALTH CARE EDUCATION/TRAINING PROGRAM

## 2025-05-12 RX ORDER — VENLAFAXINE HYDROCHLORIDE 37.5 MG/1
37.5 CAPSULE, EXTENDED RELEASE ORAL DAILY
Qty: 30 CAPSULE | Refills: 1 | Status: SHIPPED | OUTPATIENT
Start: 2025-05-12 | End: 2025-07-11

## 2025-05-12 ASSESSMENT — ENCOUNTER SYMPTOMS
HEADACHES: 0
VOMITING: 0
LIGHT-HEADEDNESS: 0
CHILLS: 0
SHORTNESS OF BREATH: 0
MYALGIAS: 1
ARTHRALGIAS: 1
DIZZINESS: 0
CONSTIPATION: 0
NAUSEA: 0
ABDOMINAL PAIN: 0
COUGH: 0
FEVER: 0
DIARRHEA: 0
FATIGUE: 1

## 2025-05-12 NOTE — PROGRESS NOTES
Subjective   Patient ID: Pamela Maya is a 34 y.o. female who presents for Follow-up (Follow to discuss medication and lab results).    HPI     She is following up after being seen by the rheumatologist.  She is being seen over at the [facility].  They do not think that she has lupus at this time as her numbers do not seem to indicate that currently.  She did recently have lab work done and is planning on forwarding them over to the office.  She is concerned because they did not give her any other information in terms of other things that she can try to do to either prevent symptoms from occurring.  She is also not sure if there should be any sort of medication that she should be started on at this time.    She is interested in coming down further on her venlafaxine and is interested in getting a reduced dose of this medication.    She is still been having a lot of her arthritis pain as well as fatigue but overall seems slightly improved.  She states that her rash has pretty much resolved at this point.      Review of Systems   Constitutional:  Positive for fatigue. Negative for chills and fever.   HENT:  Negative for congestion.    Respiratory:  Negative for cough and shortness of breath.    Cardiovascular:  Negative for chest pain.   Gastrointestinal:  Negative for abdominal pain, constipation, diarrhea, nausea and vomiting.   Musculoskeletal:  Positive for arthralgias and myalgias.   Neurological:  Negative for dizziness, light-headedness and headaches.       Objective   There were no vitals taken for this visit.    Physical Exam  Constitutional:       General: She is not in acute distress.     Appearance: Normal appearance. She is normal weight. She is not ill-appearing or toxic-appearing.   HENT:      Head: Normocephalic and atraumatic.   Neurological:      Mental Status: She is alert.         Assessment/Plan   Problem List Items Addressed This Visit           ICD-10-CM    Anxiety, generalized  F41.1    Relevant Medications    venlafaxine XR (Effexor-XR) 37.5 mg 24 hr capsule    Recurrent major depressive disorder F33.9    Relevant Medications    venlafaxine XR (Effexor-XR) 37.5 mg 24 hr capsule    Chronic fatigue - Primary R53.82     Other Visit Diagnoses         Codes      Fatigue, unspecified type     R53.83    Relevant Orders    C-Reactive Protein    Sedimentation Rate    CBC    Comprehensive Metabolic Panel      Mouth sores     K13.79    Relevant Orders    C-Reactive Protein    Sedimentation Rate    CBC    Comprehensive Metabolic Panel      Multiple joint pain     M25.50    Relevant Orders    C-Reactive Protein    Sedimentation Rate    CBC    Comprehensive Metabolic Panel      Word finding difficulty     R47.89    Relevant Orders    MR brain wo IV contrast      Confusion     R41.0    Relevant Orders    MR brain wo IV contrast      Vision changes     H53.9    Relevant Orders    MR brain wo IV contrast      Numbness and tingling in right hand     R20.0, R20.2    Relevant Orders    MR brain wo IV contrast      Numbness and tingling of foot     R20.0, R20.2    Relevant Orders    MR brain wo IV contrast          History and limited physical examination as above secondary to virtual visit.  Patient with continued symptoms and has followed up with rheumatology.  She states that based on her current numbers they are not going to make a diagnosis of lupus at this time.  She is concerned that if she does have it she is waiting to start on treatment.  She does not know of any further follow-up lab work or testing they plan on getting.  Follow-up lab work ordered to be done and we will plan on following up on the results.  Did discuss that we can also seek a second opinion with a different rheumatologist within a different health system.  She is also reporting some other symptoms including some intermittent numbness and tingling in her right hand as well as the right foot and leg.  She states it almost seems to  happen that she is having word finding difficulties even though she is able to see an object and knows what it is.  She does report intermittent vision changes.  MRI of the brain without contrast ordered for the patient and she will call if she is having any issues getting scheduled to rule out other potential causes of her symptoms.  She will forward over the results of the previous lab work as well.  Went ahead and switch to a lower dose of the venlafaxine and she will begin the process of weaning off.  Did discuss that she can do this for the next month or so and then switch to the lowest dose of medication every other day.  We can always resume with dosing if need be.  Plan for follow-up based on results of upcoming testing.  Encouraged to call sooner with any other acute concerns or complaints.    This visit was completed via telemedicine (video) call due to the restrictions of the COVID global pandemic. The visit was conducted between Pamela Maya, location [place]. and myself, Johan Castro DO, location [place]. The patient verbally consented to the telehealth visit and verbally confirmed their location. All issues were discussed and addressed as in the clinical documentation, but no physical exam was performed. If it was felt that the patient should be evaluated in a clinical setting, then they were directed there.  Spent 27:40 minutes with the patient over the telemedicine call and more than 50% of this time was spent in counseling and coordination of care.

## 2025-06-03 ENCOUNTER — APPOINTMENT (OUTPATIENT)
Dept: RADIOLOGY | Facility: CLINIC | Age: 35
End: 2025-06-03
Payer: COMMERCIAL

## 2025-06-03 DIAGNOSIS — R20.0 NUMBNESS AND TINGLING OF FOOT: ICD-10-CM

## 2025-06-03 DIAGNOSIS — R47.89 WORD FINDING DIFFICULTY: ICD-10-CM

## 2025-06-03 DIAGNOSIS — H53.9 VISION CHANGES: ICD-10-CM

## 2025-06-03 DIAGNOSIS — R20.0 NUMBNESS AND TINGLING IN RIGHT HAND: ICD-10-CM

## 2025-06-03 DIAGNOSIS — R41.0 CONFUSION: ICD-10-CM

## 2025-06-03 DIAGNOSIS — R20.2 NUMBNESS AND TINGLING IN RIGHT HAND: ICD-10-CM

## 2025-06-03 DIAGNOSIS — R20.2 NUMBNESS AND TINGLING OF FOOT: ICD-10-CM

## 2025-06-03 PROCEDURE — 70551 MRI BRAIN STEM W/O DYE: CPT

## 2025-06-03 PROCEDURE — 70551 MRI BRAIN STEM W/O DYE: CPT | Performed by: RADIOLOGY

## 2025-06-13 ENCOUNTER — TELEMEDICINE (OUTPATIENT)
Dept: PRIMARY CARE | Facility: CLINIC | Age: 35
End: 2025-06-13
Payer: COMMERCIAL

## 2025-06-13 DIAGNOSIS — F33.1 MODERATE EPISODE OF RECURRENT MAJOR DEPRESSIVE DISORDER: ICD-10-CM

## 2025-06-13 DIAGNOSIS — R47.89 WORD FINDING DIFFICULTY: ICD-10-CM

## 2025-06-13 DIAGNOSIS — F41.1 ANXIETY, GENERALIZED: ICD-10-CM

## 2025-06-13 DIAGNOSIS — R53.82 CHRONIC FATIGUE: Primary | ICD-10-CM

## 2025-06-13 DIAGNOSIS — H53.10 TIRED EYES: ICD-10-CM

## 2025-06-13 DIAGNOSIS — R29.898 FATIGUE OF UPPER EXTREMITY: ICD-10-CM

## 2025-06-13 PROCEDURE — 99214 OFFICE O/P EST MOD 30 MIN: CPT | Performed by: STUDENT IN AN ORGANIZED HEALTH CARE EDUCATION/TRAINING PROGRAM

## 2025-06-13 ASSESSMENT — ENCOUNTER SYMPTOMS
CHILLS: 0
DIARRHEA: 0
MYALGIAS: 0
VOMITING: 0
FEVER: 0
HEADACHES: 0
NAUSEA: 0
ARTHRALGIAS: 1
DIZZINESS: 0
SHORTNESS OF BREATH: 0
ABDOMINAL PAIN: 0
COUGH: 0
LIGHT-HEADEDNESS: 0
FATIGUE: 0
CONSTIPATION: 0

## 2025-06-13 NOTE — PROGRESS NOTES
Subjective   Patient ID: Pamela Maya is a 34 y.o. female who presents for Medication Problem, Nausea, and disoriented.    HPI     She dropped the the venlafaxine to the lowest dose for the first 3-4 weeks.    She starts to feel like her eyes are strained and then feels like they are lagging from where she is trying to look that starts around the mid-day. This then progressively worsens over the rest of the day and then gets better overnight. She noticed this first but was initially not alarming.    Her increased agitation really increased about a week ago. She denies really feeling angry, its more of a lack of patience and frustration.    Today what really set her off is that she was driving with her co-worker back to school and it is a route that she has taken many times in the past, but she had a panic that she had no idea where she was. She then went home and felt like she was having a full blown panic attack.    Review of Systems   Constitutional:  Negative for chills, fatigue and fever.   Respiratory:  Negative for cough and shortness of breath.    Cardiovascular:  Negative for chest pain.   Gastrointestinal:  Negative for abdominal pain, constipation, diarrhea, nausea and vomiting.   Musculoskeletal:  Positive for arthralgias. Negative for myalgias.   Neurological:  Negative for dizziness, light-headedness and headaches.       Objective   There were no vitals taken for this visit.    Physical Exam    Assessment/Plan   Problem List Items Addressed This Visit    None    This visit was completed via telemedicine (video) call due to the restrictions of the COVID global pandemic. The visit was conducted between Pamela Maya, location [place]. and myself, Johan Castro DO, location [place]. The patient verbally consented to the telehealth visit and verbally confirmed their location. All issues were discussed and addressed as in the clinical documentation, but no physical exam was performed. If it was felt  that the patient should be evaluated in a clinical setting, then they were directed there.  Spent 39:14 minutes with the patient over the telemedicine call and more than 50% of this time was spent in counseling and coordination of care.      minutes with the patient over the telemedicine call and more than 50% of this time was spent in counseling and coordination of care.

## 2025-06-25 LAB — ACHR BIND AB SER-SCNC: <0.3 NMOL/L
